# Patient Record
Sex: FEMALE | Race: WHITE
[De-identification: names, ages, dates, MRNs, and addresses within clinical notes are randomized per-mention and may not be internally consistent; named-entity substitution may affect disease eponyms.]

---

## 2017-03-29 NOTE — REP
Clinical:  Menorrhagia

 

Technique:  Transabdominal pelvic ultrasound followed by transvaginal examination

for better evaluation of the endometrium and adnexa with color Doppler evaluation

of the ovaries.

 

Findings:

 

Bladder is unremarkable and measures 12.9 x 8.8 x 6.4 cm .

 

Heterogeneous myomatous uterus measures 8.5 x 5.0 x 5.7 cm with 1 cm left lateral

intramural fibroid, 1.5 cm posterior left intramural fibroid, and 0.7 cm midline

posterior intramural fibroid.  The endometrial complex measures 13.9 mm

thickness.

 

Bilateral ovaries are normal in appearance.  Right ovary measures 3.0 x 2.6 x 1.8

cm.  Left ovary measures 2.6 x 1.4 x 2.6 cm.

 

No pelvic fluid or adnexal mass lesion.

 

Impression:

1.  Heterogeneous uterus with three definable fibroids measuring up to 1.5 cm as

described above.

## 2017-08-09 ENCOUNTER — HOSPITAL ENCOUNTER (OUTPATIENT)
Dept: HOSPITAL 53 - M LAB | Age: 42
End: 2017-08-09
Attending: FAMILY MEDICINE
Payer: COMMERCIAL

## 2017-08-09 DIAGNOSIS — Z00.00: Primary | ICD-10-CM

## 2017-08-09 LAB
ALBUMIN SERPL BCG-MCNC: 3.9 GM/DL (ref 3.2–5.2)
ALBUMIN/GLOB SERPL: 1.22 {RATIO} (ref 1–1.93)
ALP SERPL-CCNC: 37 U/L (ref 45–117)
ALT SERPL W P-5'-P-CCNC: 18 U/L (ref 12–78)
ANION GAP SERPL CALC-SCNC: 7 MEQ/L (ref 8–16)
AST SERPL-CCNC: 10 U/L (ref 15–37)
BASOPHILS # BLD AUTO: 0 K/MM3 (ref 0–0.2)
BASOPHILS NFR BLD AUTO: 1 % (ref 0–1)
BILIRUB SERPL-MCNC: 0.4 MG/DL (ref 0.2–1)
BUN SERPL-MCNC: 12 MG/DL (ref 7–18)
CALCIUM SERPL-MCNC: 8.9 MG/DL (ref 8.5–10.1)
CHLORIDE SERPL-SCNC: 106 MEQ/L (ref 98–107)
CHOLEST SERPL-MCNC: 168 MG/DL (ref ?–200)
CO2 SERPL-SCNC: 28 MEQ/L (ref 21–32)
CREAT SERPL-MCNC: 1.06 MG/DL (ref 0.55–1.02)
EOSINOPHIL # BLD AUTO: 0.3 K/MM3 (ref 0–0.5)
EOSINOPHIL NFR BLD AUTO: 6.2 % (ref 0–3)
ERYTHROCYTE [DISTWIDTH] IN BLOOD BY AUTOMATED COUNT: 12.8 % (ref 11.5–14.5)
GFR SERPL CREATININE-BSD FRML MDRD: 60 ML/MIN/{1.73_M2} (ref 58–?)
GLUCOSE SERPL-MCNC: 102 MG/DL (ref 70–105)
LARGE UNSTAINED CELL #: 0.1 K/MM3 (ref 0–0.4)
LARGE UNSTAINED CELL %: 2.4 % (ref 0–4)
LYMPHOCYTES # BLD AUTO: 1.4 K/MM3 (ref 1.5–4.5)
LYMPHOCYTES NFR BLD AUTO: 29 % (ref 24–44)
MCH RBC QN AUTO: 31.3 PG (ref 27–33)
MCHC RBC AUTO-ENTMCNC: 33.6 G/DL (ref 32–36.5)
MCV RBC AUTO: 93.2 FL (ref 80–96)
MONOCYTES # BLD AUTO: 0.3 K/MM3 (ref 0–0.8)
MONOCYTES NFR BLD AUTO: 6.6 % (ref 0–5)
NEUTROPHILS # BLD AUTO: 2.4 K/MM3 (ref 1.8–7.7)
NEUTROPHILS NFR BLD AUTO: 54.8 % (ref 36–66)
PLATELET # BLD AUTO: 249 K/MM3 (ref 150–450)
POTASSIUM SERPL-SCNC: 4.4 MEQ/L (ref 3.5–5.1)
PROT SERPL-MCNC: 7.1 GM/DL (ref 6.4–8.2)
SODIUM SERPL-SCNC: 141 MEQ/L (ref 136–145)
TRIGL SERPL-MCNC: 64 MG/DL (ref ?–150)
WBC # BLD AUTO: 4.4 K/MM3 (ref 4–10)

## 2018-07-24 ENCOUNTER — HOSPITAL ENCOUNTER (OUTPATIENT)
Dept: HOSPITAL 53 - M LAB | Age: 43
End: 2018-07-24
Attending: FAMILY MEDICINE
Payer: COMMERCIAL

## 2018-07-24 DIAGNOSIS — Z00.00: Primary | ICD-10-CM

## 2018-07-24 LAB
25(OH)D3 SERPL-MCNC: 85.2 NG/ML (ref 30–100)
ALBUMIN/GLOBULIN RATIO: 1.09 (ref 1–1.93)
ALBUMIN: 3.8 GM/DL (ref 3.2–5.2)
ALKALINE PHOSPHATASE: 41 U/L (ref 45–117)
ALT SERPL W P-5'-P-CCNC: 21 U/L (ref 12–78)
ANION GAP: 8 MEQ/L (ref 8–16)
AST SERPL-CCNC: 10 U/L (ref 7–37)
BASO #: 0.1 10^3/UL (ref 0–0.2)
BASO %: 0.8 % (ref 0–1)
BILIRUBIN,TOTAL: 0.4 MG/DL (ref 0.2–1)
BLOOD UREA NITROGEN: 19 MG/DL (ref 7–18)
CALCIUM LEVEL: 8.8 MG/DL (ref 8.5–10.1)
CARBON DIOXIDE LEVEL: 27 MEQ/L (ref 21–32)
CHLORIDE LEVEL: 105 MEQ/L (ref 98–107)
CHOLEST SERPL-MCNC: 160 MG/DL (ref ?–200)
CHOLESTEROL RISK RATIO: 2.62 (ref ?–5)
CREATININE FOR GFR: 1.03 MG/DL (ref 0.55–1.3)
EOS #: 0.3 10^3/UL (ref 0–0.5)
EOSINOPHIL NFR BLD AUTO: 4.7 % (ref 0–3)
FREE T4: 0.83 NG/DL (ref 0.76–1.46)
GFR SERPL CREATININE-BSD FRML MDRD: > 60 ML/MIN/{1.73_M2} (ref 58–?)
GLUCOSE, FASTING: 91 MG/DL (ref 70–100)
HDLC SERPL-MCNC: 61 MG/DL (ref 40–?)
HEMATOCRIT: 40.1 % (ref 36–47)
HEMOGLOBIN: 13 G/DL (ref 12–15.5)
IMMATURE GRANULOCYTE %: 0.5 % (ref 0–3)
LDL CHOLESTEROL: 87 MG/DL (ref ?–100)
LYMPH #: 1.4 10^3/UL (ref 1.5–4.5)
LYMPH %: 23.1 % (ref 24–44)
MEAN CORPUSCULAR HEMOGLOBIN: 29.7 PG (ref 27–33)
MEAN CORPUSCULAR HGB CONC: 32.4 G/DL (ref 32–36.5)
MEAN CORPUSCULAR VOLUME: 91.6 FL (ref 80–96)
MONO #: 0.4 10^3/UL (ref 0–0.8)
MONO %: 6.8 % (ref 0–5)
NEUTROPHILS #: 3.9 10^3/UL (ref 1.8–7.7)
NEUTROPHILS %: 64.1 % (ref 36–66)
NONHDLC SERPL-MCNC: 99 MG/DL
NRBC BLD AUTO-RTO: 0 % (ref 0–0)
PLATELET COUNT, AUTOMATED: 233 10^3/UL (ref 150–450)
POTASSIUM SERUM: 4.4 MEQ/L (ref 3.5–5.1)
RED BLOOD COUNT: 4.38 10^6/UL (ref 4–5.4)
RED CELL DISTRIBUTION WIDTH: 13.2 % (ref 11.5–14.5)
SODIUM LEVEL: 140 MEQ/L (ref 136–145)
THYROID STIMULATING HORMONE: 1.42 UIU/ML (ref 0.36–3.74)
TOTAL PROTEIN: 7.3 GM/DL (ref 6.4–8.2)
TRIGLYCERIDES LEVEL: 60 MG/DL (ref ?–150)
WHITE BLOOD COUNT: 6.1 10^3/UL (ref 4–10)

## 2018-07-24 PROCEDURE — 84443 ASSAY THYROID STIM HORMONE: CPT

## 2019-08-14 ENCOUNTER — HOSPITAL ENCOUNTER (OUTPATIENT)
Dept: HOSPITAL 53 - M LAB | Age: 44
End: 2019-08-14
Attending: FAMILY MEDICINE
Payer: COMMERCIAL

## 2019-08-14 DIAGNOSIS — Z00.00: Primary | ICD-10-CM

## 2019-08-14 LAB
ALBUMIN SERPL BCG-MCNC: 3.6 GM/DL (ref 3.2–5.2)
ALT SERPL W P-5'-P-CCNC: 19 U/L (ref 12–78)
BASOPHILS # BLD AUTO: 0 10^3/UL (ref 0–0.2)
BASOPHILS NFR BLD AUTO: 0.8 % (ref 0–1)
BILIRUB SERPL-MCNC: 0.3 MG/DL (ref 0.2–1)
BUN SERPL-MCNC: 14 MG/DL (ref 7–18)
CALCIUM SERPL-MCNC: 8.6 MG/DL (ref 8.5–10.1)
CHLORIDE SERPL-SCNC: 104 MEQ/L (ref 98–107)
CHOLEST SERPL-MCNC: 194 MG/DL (ref ?–200)
CHOLEST/HDLC SERPL: 2.85 {RATIO} (ref ?–5)
CO2 SERPL-SCNC: 28 MEQ/L (ref 21–32)
CREAT SERPL-MCNC: 0.87 MG/DL (ref 0.55–1.3)
EOSINOPHIL # BLD AUTO: 0.3 10^3/UL (ref 0–0.5)
EOSINOPHIL NFR BLD AUTO: 6.9 % (ref 0–3)
GFR SERPL CREATININE-BSD FRML MDRD: > 60 ML/MIN/{1.73_M2} (ref 58–?)
GLUCOSE SERPL-MCNC: 97 MG/DL (ref 70–100)
HCT VFR BLD AUTO: 37.3 % (ref 36–47)
HDLC SERPL-MCNC: 68 MG/DL (ref 40–?)
HGB BLD-MCNC: 12.1 G/DL (ref 12–15.5)
LDLC SERPL CALC-MCNC: 114 MG/DL (ref ?–100)
LYMPHOCYTES # BLD AUTO: 1.4 10^3/UL (ref 1.5–4.5)
LYMPHOCYTES NFR BLD AUTO: 27.4 % (ref 24–44)
MCH RBC QN AUTO: 30 PG (ref 27–33)
MCHC RBC AUTO-ENTMCNC: 32.4 G/DL (ref 32–36.5)
MCV RBC AUTO: 92.6 FL (ref 80–96)
MONOCYTES # BLD AUTO: 0.4 10^3/UL (ref 0–0.8)
MONOCYTES NFR BLD AUTO: 7.1 % (ref 0–5)
NEUTROPHILS # BLD AUTO: 2.8 10^3/UL (ref 1.8–7.7)
NEUTROPHILS NFR BLD AUTO: 57.6 % (ref 36–66)
NONHDLC SERPL-MCNC: 126 MG/DL
PLATELET # BLD AUTO: 267 10^3/UL (ref 150–450)
POTASSIUM SERPL-SCNC: 4.2 MEQ/L (ref 3.5–5.1)
PROT SERPL-MCNC: 6.8 GM/DL (ref 6.4–8.2)
RBC # BLD AUTO: 4.03 10^6/UL (ref 4–5.4)
SODIUM SERPL-SCNC: 140 MEQ/L (ref 136–145)
TRIGL SERPL-MCNC: 59 MG/DL (ref ?–150)
TSH SERPL DL<=0.005 MIU/L-ACNC: 2.1 UIU/ML (ref 0.36–3.74)
WBC # BLD AUTO: 4.9 10^3/UL (ref 4–10)

## 2019-11-07 ENCOUNTER — HOSPITAL ENCOUNTER (OUTPATIENT)
Dept: HOSPITAL 53 - M SFHCWAGY | Age: 44
End: 2019-11-07
Attending: NURSE PRACTITIONER
Payer: COMMERCIAL

## 2019-11-07 DIAGNOSIS — Z12.4: Primary | ICD-10-CM

## 2019-11-07 PROCEDURE — 87624 HPV HI-RISK TYP POOLED RSLT: CPT

## 2019-11-08 LAB — HPV LOW VOL RFLX: (no result)

## 2020-09-11 ENCOUNTER — HOSPITAL ENCOUNTER (OUTPATIENT)
Dept: HOSPITAL 53 - M LAB | Age: 45
End: 2020-09-11
Attending: NURSE PRACTITIONER
Payer: COMMERCIAL

## 2020-09-11 DIAGNOSIS — Z00.00: Primary | ICD-10-CM

## 2020-09-11 LAB
25(OH)D3 SERPL-MCNC: 66.4 NG/ML (ref 30–100)
ALBUMIN SERPL BCG-MCNC: 3.6 GM/DL (ref 3.2–5.2)
ALT SERPL W P-5'-P-CCNC: 23 IU/L (ref 0–32)
BASOPHILS # BLD AUTO: 0 10^3/UL (ref 0–0.2)
BASOPHILS NFR BLD AUTO: 0.6 % (ref 0–1)
BILIRUB SERPL-MCNC: 0.3 MG/DL (ref 0.2–1)
BUN SERPL-MCNC: 17 MG/DL (ref 7–18)
CALCIUM SERPL-MCNC: 9 MG/DL (ref 8.5–10.1)
CHLORIDE SERPL-SCNC: 107 MEQ/L (ref 98–107)
CHOLEST SERPL-MCNC: 225 MG/DL (ref ?–200)
CHOLEST/HDLC SERPL: 2.92 {RATIO} (ref ?–5)
CO2 SERPL-SCNC: 28 MMOL/L (ref 20–29)
CREAT SERPL-MCNC: 0.9 MG/DL (ref 0.55–1.3)
EOSINOPHIL # BLD AUTO: 0.2 10^3/UL (ref 0–0.5)
EOSINOPHIL NFR BLD AUTO: 3.6 % (ref 0–3)
GFR SERPL CREATININE-BSD FRML MDRD: > 60 ML/MIN/{1.73_M2} (ref 58–?)
GLUCOSE SERPL-MCNC: 94 MG/DL (ref 70–100)
HCT VFR BLD AUTO: 35.2 % (ref 36–47)
HDLC SERPL-MCNC: 77 MG/DL (ref 40–?)
HGB BLD-MCNC: 10.9 G/DL (ref 12–15.5)
LDLC SERPL CALC-MCNC: 134.6 MG/DL (ref ?–100)
LYMPHOCYTES # BLD AUTO: 1.3 10^3/UL (ref 1.5–5)
LYMPHOCYTES NFR BLD AUTO: 27.7 % (ref 24–44)
MCH RBC QN AUTO: 25.5 PG (ref 27–33)
MCHC RBC AUTO-ENTMCNC: 31 G/DL (ref 32–36.5)
MCV RBC AUTO: 82.2 FL (ref 80–96)
MONOCYTES # BLD AUTO: 0.4 10^3/UL (ref 0–0.8)
MONOCYTES NFR BLD AUTO: 8.6 % (ref 0–5)
NEUTROPHILS # BLD AUTO: 2.8 10^3/UL (ref 1.5–8.5)
NEUTROPHILS NFR BLD AUTO: 58.9 % (ref 36–66)
NONHDLC SERPL-MCNC: 148 MG/DL
PLATELET # BLD AUTO: 335 10^3/UL (ref 150–450)
POTASSIUM SERPL-SCNC: 4.3 MEQ/L (ref 3.5–5.1)
PROT SERPL-MCNC: 7.2 GM/DL (ref 6.4–8.2)
RBC # BLD AUTO: 4.28 10^6/UL (ref 4–5.4)
SODIUM SERPL-SCNC: 139 MEQ/L (ref 136–145)
T4 FREE SERPL-MCNC: 0.84 NG/DL (ref 0.76–1.46)
TRIGL SERPL-MCNC: 67 MG/DL (ref ?–150)
TSH SERPL DL<=0.005 MIU/L-ACNC: 1.78 UIU/ML (ref 0.36–3.74)
WBC # BLD AUTO: 4.8 10^3/UL (ref 4–10)

## 2021-02-17 ENCOUNTER — HOSPITAL ENCOUNTER (OUTPATIENT)
Dept: HOSPITAL 53 - M RAD | Age: 46
End: 2021-02-17
Attending: OTOLARYNGOLOGY
Payer: COMMERCIAL

## 2021-02-17 DIAGNOSIS — J32.0: Primary | ICD-10-CM

## 2021-02-18 NOTE — REP
INDICATION:

SINUSITIS, WITH BRAIN LAB.



COMPARISON:

Comparison study 10 November 2005..



TECHNIQUE:

Helical scanning is acquired and 2 mm axial images re-formatted.  Coronal MPR images

are generated and reviewed.



FINDINGS:

There is a mucous retention cyst in the floor of the left maxillary sinus.  The

maxillary sinuses are otherwise clear.  There is no evidence of sphenoid, ethmoid, or

mastoid disease.  The frontal sinuses are not developed on either side.  No

intraorbital abnormality is seen.  Bony nasal septum deviates slightly to the left.

Nasal turbinates soft tissues are unremarkable.  Ostiomeatal complexes are widely

patent bilaterally.  Nasal ethmoid recesses are clear.  No intracranial abnormality is

seen.  Nasal pharyngeal airway is unremarkable.  The facial soft tissues are normal.

The visualized mandibular structures are unremarkable.



IMPRESSION:

There is a mucous retention cyst in the floor of the left maxillary sinus.  Otherwise

normal paranasal sinus CT study.





<Electronically signed by Sherif Salazar > 02/18/21 [id number]

## 2021-04-11 ENCOUNTER — HOSPITAL ENCOUNTER (INPATIENT)
Dept: HOSPITAL 53 - M ED | Age: 46
LOS: 3 days | Discharge: HOME | DRG: 340 | End: 2021-04-14
Attending: SURGERY | Admitting: SURGERY
Payer: COMMERCIAL

## 2021-04-11 VITALS — DIASTOLIC BLOOD PRESSURE: 78 MMHG | SYSTOLIC BLOOD PRESSURE: 128 MMHG

## 2021-04-11 VITALS — SYSTOLIC BLOOD PRESSURE: 136 MMHG | DIASTOLIC BLOOD PRESSURE: 79 MMHG

## 2021-04-11 VITALS — DIASTOLIC BLOOD PRESSURE: 72 MMHG | SYSTOLIC BLOOD PRESSURE: 129 MMHG

## 2021-04-11 VITALS — DIASTOLIC BLOOD PRESSURE: 70 MMHG | SYSTOLIC BLOOD PRESSURE: 128 MMHG

## 2021-04-11 VITALS — WEIGHT: 161.38 LBS | HEIGHT: 61 IN | BODY MASS INDEX: 30.47 KG/M2

## 2021-04-11 VITALS — DIASTOLIC BLOOD PRESSURE: 73 MMHG | SYSTOLIC BLOOD PRESSURE: 155 MMHG

## 2021-04-11 DIAGNOSIS — D64.9: ICD-10-CM

## 2021-04-11 DIAGNOSIS — K35.33: Primary | ICD-10-CM

## 2021-04-11 DIAGNOSIS — Z79.899: ICD-10-CM

## 2021-04-11 DIAGNOSIS — I10: ICD-10-CM

## 2021-04-11 PROCEDURE — 0DTJ4ZZ RESECTION OF APPENDIX, PERCUTANEOUS ENDOSCOPIC APPROACH: ICD-10-PCS | Performed by: SURGERY

## 2021-04-11 RX ADMIN — SODIUM CHLORIDE SCH MLS/HR: 9 INJECTION, SOLUTION INTRAVENOUS at 21:00

## 2021-04-12 VITALS — DIASTOLIC BLOOD PRESSURE: 50 MMHG | SYSTOLIC BLOOD PRESSURE: 115 MMHG

## 2021-04-12 VITALS — SYSTOLIC BLOOD PRESSURE: 106 MMHG | DIASTOLIC BLOOD PRESSURE: 58 MMHG

## 2021-04-12 VITALS — SYSTOLIC BLOOD PRESSURE: 107 MMHG | DIASTOLIC BLOOD PRESSURE: 65 MMHG

## 2021-04-12 VITALS — SYSTOLIC BLOOD PRESSURE: 132 MMHG | DIASTOLIC BLOOD PRESSURE: 65 MMHG

## 2021-04-12 VITALS — DIASTOLIC BLOOD PRESSURE: 67 MMHG | SYSTOLIC BLOOD PRESSURE: 131 MMHG

## 2021-04-12 VITALS — DIASTOLIC BLOOD PRESSURE: 69 MMHG | SYSTOLIC BLOOD PRESSURE: 115 MMHG

## 2021-04-12 VITALS — DIASTOLIC BLOOD PRESSURE: 54 MMHG | SYSTOLIC BLOOD PRESSURE: 113 MMHG

## 2021-04-12 LAB
ALBUMIN SERPL BCG-MCNC: 2.8 GM/DL (ref 3.2–5.2)
ALT SERPL W P-5'-P-CCNC: 18 U/L (ref 12–78)
BASOPHILS # BLD AUTO: 0 10^3/UL (ref 0–0.2)
BASOPHILS NFR BLD AUTO: 0.4 % (ref 0–1)
BILIRUB SERPL-MCNC: 0.3 MG/DL (ref 0.2–1)
BUN SERPL-MCNC: 9 MG/DL (ref 7–18)
CALCIUM SERPL-MCNC: 7.9 MG/DL (ref 8.5–10.1)
CHLORIDE SERPL-SCNC: 111 MEQ/L (ref 98–107)
CO2 SERPL-SCNC: 20 MEQ/L (ref 21–32)
CREAT SERPL-MCNC: 0.73 MG/DL (ref 0.55–1.3)
EOSINOPHIL # BLD AUTO: 0 10^3/UL (ref 0–0.5)
EOSINOPHIL NFR BLD AUTO: 0.4 % (ref 0–3)
GFR SERPL CREATININE-BSD FRML MDRD: > 60 ML/MIN/{1.73_M2} (ref 58–?)
GLUCOSE SERPL-MCNC: 90 MG/DL (ref 70–100)
HCT VFR BLD AUTO: 27.7 % (ref 36–47)
HGB BLD-MCNC: 8.3 G/DL (ref 12–15.5)
LYMPHOCYTES # BLD AUTO: 0.4 10^3/UL (ref 1.5–5)
LYMPHOCYTES NFR BLD AUTO: 3.9 % (ref 24–44)
MCH RBC QN AUTO: 24.7 PG (ref 27–33)
MCHC RBC AUTO-ENTMCNC: 30 G/DL (ref 32–36.5)
MCV RBC AUTO: 82.4 FL (ref 80–96)
MONOCYTES # BLD AUTO: 0.3 10^3/UL (ref 0–0.8)
MONOCYTES NFR BLD AUTO: 3.2 % (ref 2–8)
NEUTROPHILS # BLD AUTO: 8.5 10^3/UL (ref 1.5–8.5)
NEUTROPHILS NFR BLD AUTO: 91.7 % (ref 36–66)
PLATELET # BLD AUTO: 198 10^3/UL (ref 150–450)
POTASSIUM SERPL-SCNC: 3.5 MEQ/L (ref 3.5–5.1)
PROT SERPL-MCNC: 5.7 GM/DL (ref 6.4–8.2)
RBC # BLD AUTO: 3.36 10^6/UL (ref 4–5.4)
SODIUM SERPL-SCNC: 140 MEQ/L (ref 136–145)
WBC # BLD AUTO: 9.2 10^3/UL (ref 4–10)

## 2021-04-12 RX ADMIN — METRONIDAZOLE SCH MLS/HR: 500 INJECTION, SOLUTION INTRAVENOUS at 17:28

## 2021-04-12 RX ADMIN — KETOROLAC TROMETHAMINE SCH MG: 30 INJECTION, SOLUTION INTRAMUSCULAR at 17:28

## 2021-04-12 RX ADMIN — LOSARTAN POTASSIUM SCH MG: 25 TABLET, FILM COATED ORAL at 09:00

## 2021-04-12 RX ADMIN — CIPROFLOXACIN SCH MLS/HR: 2 INJECTION, SOLUTION INTRAVENOUS at 20:05

## 2021-04-12 RX ADMIN — SODIUM CHLORIDE SCH MLS/HR: 9 INJECTION, SOLUTION INTRAVENOUS at 07:43

## 2021-04-12 RX ADMIN — SODIUM CHLORIDE SCH MLS/HR: 9 INJECTION, SOLUTION INTRAVENOUS at 22:13

## 2021-04-12 RX ADMIN — METRONIDAZOLE SCH MLS/HR: 500 INJECTION, SOLUTION INTRAVENOUS at 10:48

## 2021-04-12 RX ADMIN — CIPROFLOXACIN SCH MLS/HR: 2 INJECTION, SOLUTION INTRAVENOUS at 08:00

## 2021-04-12 RX ADMIN — KETOROLAC TROMETHAMINE SCH MG: 30 INJECTION, SOLUTION INTRAMUSCULAR at 12:31

## 2021-04-12 RX ADMIN — KETOROLAC TROMETHAMINE SCH MG: 30 INJECTION, SOLUTION INTRAMUSCULAR at 23:42

## 2021-04-12 RX ADMIN — KETOROLAC TROMETHAMINE SCH MG: 30 INJECTION, SOLUTION INTRAMUSCULAR at 00:09

## 2021-04-12 RX ADMIN — METRONIDAZOLE SCH MLS/HR: 500 INJECTION, SOLUTION INTRAVENOUS at 02:22

## 2021-04-12 RX ADMIN — KETOROLAC TROMETHAMINE SCH MG: 30 INJECTION, SOLUTION INTRAMUSCULAR at 06:01

## 2021-04-12 NOTE — IPNPDOC
Text Note


Date of Service


The patient was seen on 4/12/21.





NOTE


General Surgery Dr Marie. 





The patient is a 45-year-old female admitted with acute appendicitis and 

perforation with peritonitis, POD 1 status post laparoscopic appendectomy with 

irrigation and debridement of peritonitis as per Dr. Marie 4/11/21.





The patient is resting in bed and states pain is controlled. She has been 

tolerating ice chips and sips. 





General. Awake and alert sitting up in bed


Afebrile.


Heart rate 77, respiratory rate 18, blood pressure 113/54.


Lungs clear to auscultation


S1-S2 regular rate rhythm


Abdomen with surgical dressings intact, no drainage. Abdomen is soft, mild 

tenderness around surgical sites only. Nondistended.





WBC 9.2, hemoglobin 8.3, platelets 198





Assessment/plan


POD 1 status post laparoscopic appendectomy with irrigation and debridement of 

peritonitis as per Dr. Marie. 


Pain is controlled.


Afebrile. WBC this morning 9.2


Surgical sites C/D/I. 


Tolerating ice chips


IVF 100cc/hr


Continue with IV Cipro/Flagyl


Continue to monitor.





VS,Fishbone, I+O


VS, Fishbone, I+O


Laboratory Tests


4/12/21 08:04











Vital Signs








  Date Time  Temp Pulse Resp B/P (MAP) Pulse Ox O2 Delivery O2 Flow Rate FiO2


 


4/12/21 09:00    115/69    


 


4/12/21 08:00 98.5 77 20  98 Room Air  














I&O- Last 24 Hours up to 6 AM 


 


 4/12/21





 06:00


 


Intake Total 2120 ml


 


Output Total 220 ml


 


Balance 1900 ml











Attending Note


Attending Note


Agree with note as above. 


No N/V.  Abdomen non distended and has BS.


Will start clears.  Advance in am if tolerated.


Check CBCD am.











Judith Bañuelos              Apr 12, 2021 13:32


Cliff Marie                 Apr 12, 2021 20:58

## 2021-04-12 NOTE — RO
OPERATIVE NOTE



DATE OF OPERATION: 04/11/2021



PREOPERATIVE DIAGNOSIS: Acute appendicitis.



POSTOPERATIVE DIAGNOSIS: Acute appendicitis with perforation and diffuse

peritonitis.



PROCEDURE: Laparoscopic appendectomy with irrigation and debridement of

peritonitis.



SURGEON: Cliff Marie MD



ASSISTANT: 



ANESTHESIA: General.



INDICATIONS FOR THE PROCEDURE: The patient is a 45-year-old woman with a 1-1/2

day history of abdominal pain. This had become more severe. The pain was not

just localized to the right lower quadrant but across her lower abdomen. A CT

scan showed inflammatory changes of the appendix although there was some free

fluid noted in the pelvis. She is now for a laparoscopic appendectomy for

appendicitis.



DESCRIPTION OF PROCEDURE: The patient was brought to the operating room and

placed on the table in a supine position. She was placed under general

endotracheal anesthesia. The patient's abdomen was prepped and draped in a

sterile fashion. A 0.25% Marcaine was infiltrated at each of the trocar sites

as needed. 



A short supraumbilical midline incision was made and deepened to the fascia. A

Veress needle was inserted and after a positive hanging drop test, the abdomen

was inflated with carbon dioxide gas. When the abdomen had been fully inflated,

the Veress needle was removed and the fascia was incised, and a 12-mm port

placed without difficulty. The laparoscope was inserted and immediately

purulent-appearing fluid was noted in the pelvis in the right pericolic gutter

and up along the lateral aspect of the liver. The small bowel appeared somewhat

reddened and inflamed. Two 5-mm trocars were placed in the left lower quadrant.

A grasper and suction  were inserted and the large amount of purulent

fluid was suctioned from the pelvis. A portion of this was trapped in a

specimen trap and sent for Gram stain and culture and sensitivity. Following

this, the fluid was irrigated from the pelvis and right

lower quadrant were cleared of fluid as much as possible. The terminal ileum

was then folded medially and the tip of the inflamed appendix was identified

just lateral to the terminal ileum. There were some old-appearing perhaps

developmental attachments around the appendix tethering this to the lateral

aspect of the terminal ileum and the cecum. These were lysed with the hook

cautery to free the appendix. Dissection then continued to divide the

mesoappendix using the hook cautery with care to thoroughly cauterize the

vessels. The dissection proceeded freeing the terminal ileum off of the medial

aspect of the appendix. It was clear that the appendix was perforated. The

appendix was quite short overall. Once the base of the appendix had been fully

exposed and dissected free, a 45-mm endoscopic linear cutter with a blue load

was used to transect the base of the appendix where it joined the cecum. The

appendix was placed in an Endopouch. I then systematically irrigated the entire

abdomen. The patient was initially placed into a slight Trendelenburg position

and the pelvis was thoroughly irrigated. The patient was then placed flat and

the right upper quadrant and left upper quadrant were then irrigated. The

patient was then placed into a moderately steep reverse Trendelenburg to allow

the fluid to all drain toward the pelvis. The irrigation was continued until it

returned quite clear and this was all then suctioned from the abdomen as

thoroughly as possible. The patient was then returned to a flat position to

remove any remaining fluid in the pelvis. Final inspection revealed that the

appendiceal stump was well sealed without bleeding. There was no obvious

remaining debris in the right lower quadrant or pelvis. I did not feel a drain

would be of benefit in this patient. The abdomen was, therefore, deflated and

the trocars were removed. The appendix was recovered through the supraumbilical

site and sent for permanent pathology. The fascia at the supraumbilical site

was closed with interrupted sutures of 1-0 Vicryl. The skin incisions were then

all closed with 4-0 Vicryl and Steri-Strips. Light dressings were applied. The

patient tolerated the procedure well without apparent complication. She was

awakened in the operating room, extubated, and moved to the recovery room in

stable condition.

MARY

## 2021-04-13 VITALS — SYSTOLIC BLOOD PRESSURE: 142 MMHG | DIASTOLIC BLOOD PRESSURE: 80 MMHG

## 2021-04-13 VITALS — DIASTOLIC BLOOD PRESSURE: 81 MMHG | SYSTOLIC BLOOD PRESSURE: 122 MMHG

## 2021-04-13 VITALS — DIASTOLIC BLOOD PRESSURE: 97 MMHG | SYSTOLIC BLOOD PRESSURE: 155 MMHG

## 2021-04-13 VITALS — DIASTOLIC BLOOD PRESSURE: 90 MMHG | SYSTOLIC BLOOD PRESSURE: 150 MMHG

## 2021-04-13 VITALS — DIASTOLIC BLOOD PRESSURE: 89 MMHG | SYSTOLIC BLOOD PRESSURE: 136 MMHG

## 2021-04-13 VITALS — SYSTOLIC BLOOD PRESSURE: 137 MMHG | DIASTOLIC BLOOD PRESSURE: 63 MMHG

## 2021-04-13 LAB
EOSINOPHIL NFR BLD MANUAL: 5 % (ref 0–3)
HCT VFR BLD AUTO: 25.6 % (ref 36–47)
HGB BLD-MCNC: 8 G/DL (ref 12–15.5)
LYMPHOCYTES NFR BLD MANUAL: 4 % (ref 16–44)
MCH RBC QN AUTO: 25.1 PG (ref 27–33)
MCHC RBC AUTO-ENTMCNC: 31.3 G/DL (ref 32–36.5)
MCV RBC AUTO: 80.3 FL (ref 80–96)
NEUTROPHILS NFR BLD MANUAL: 91 % (ref 28–66)
PLATELET # BLD AUTO: 178 10^3/UL (ref 150–450)
PLATELET BLD QL SMEAR: NORMAL
RBC # BLD AUTO: 3.19 10^6/UL (ref 4–5.4)
WBC # BLD AUTO: 5.2 10^3/UL (ref 4–10)

## 2021-04-13 RX ADMIN — LOSARTAN POTASSIUM SCH MG: 25 TABLET, FILM COATED ORAL at 08:21

## 2021-04-13 RX ADMIN — SODIUM CHLORIDE SCH MLS/HR: 9 INJECTION, SOLUTION INTRAVENOUS at 12:10

## 2021-04-13 RX ADMIN — METRONIDAZOLE SCH MLS/HR: 500 INJECTION, SOLUTION INTRAVENOUS at 09:52

## 2021-04-13 RX ADMIN — CIPROFLOXACIN SCH MLS/HR: 2 INJECTION, SOLUTION INTRAVENOUS at 08:20

## 2021-04-13 RX ADMIN — KETOROLAC TROMETHAMINE SCH MG: 30 INJECTION, SOLUTION INTRAMUSCULAR at 17:47

## 2021-04-13 RX ADMIN — SODIUM CHLORIDE SCH MLS/HR: 9 INJECTION, SOLUTION INTRAVENOUS at 12:18

## 2021-04-13 RX ADMIN — METRONIDAZOLE SCH MLS/HR: 500 INJECTION, SOLUTION INTRAVENOUS at 17:47

## 2021-04-13 RX ADMIN — CIPROFLOXACIN SCH MLS/HR: 2 INJECTION, SOLUTION INTRAVENOUS at 20:58

## 2021-04-13 RX ADMIN — KETOROLAC TROMETHAMINE SCH MG: 30 INJECTION, SOLUTION INTRAMUSCULAR at 12:15

## 2021-04-13 RX ADMIN — METRONIDAZOLE SCH MLS/HR: 500 INJECTION, SOLUTION INTRAVENOUS at 01:41

## 2021-04-13 RX ADMIN — KETOROLAC TROMETHAMINE SCH MG: 30 INJECTION, SOLUTION INTRAMUSCULAR at 05:42

## 2021-04-13 NOTE — IPNPDOC
Text Note


Date of Service


The patient was seen on 4/13/21.





NOTE


General Surgery Dr Marie. 





The patient is a 45-year-old female admitted with acute appendicitis and 

perforation with peritonitis, POD 2 status post laparoscopic appendectomy with 

irrigation and debridement of peritonitis as per Dr. Marie 4/11/21.





The patient is OOb to chair and states pain is controlled. She has been 

tolerating clear liquids. Denies N/V. Reports flatus and BM x1 yesterday and 

this AM, states diarrhea. 





General. Awake and alert, NAD


Tmax 100.3 noon 4/12. 100.1 at [place]. Afebrile this AM. 


VSS


Lungs clear to auscultation


S1-S2 regular rate rhythm


Abdomen with surgical dressings intact, no drainage. Abdomen is soft, mild 

tenderness around surgical sites. Nondistended.





WBC 5.2, hemoglobin 8.0, platelets 178


I/O 1780/1901, -121.





Assessment/plan


POD 2 status post laparoscopic appendectomy with irrigation and debridement of 

peritonitis as per Dr. Marie. 


Pain is controlled with Toradol IV. 


Tmax 100.3 past 24 hrs. 


WBC this morning 5.2


Surgical sites C/D/I. 


Tolerating clears, advance to reg diet


IVF 100cc/hr


Continue with IV Cipro/Flagyl


Continue to monitor.





PND. States uses homeopathic nasal spray at home, added saline nasal spray 

Q2prn.





VS,Fishbone, I+O


VS, Fishbone, I+O


Laboratory Tests


4/13/21 06:46











Vital Signs








  Date Time  Temp Pulse Resp B/P (MAP) Pulse Ox O2 Delivery O2 Flow Rate FiO2


 


4/13/21 08:21    155/97    


 


4/13/21 08:18 97.7 76 16  100   


 


4/13/21 04:00      Room Air  














I&O- Last 24 Hours up to 6 AM 


 


 4/13/21





 05:59


 


Intake Total 1680 ml


 


Output Total 2301 ml


 


Balance -621 ml











Attending Note


Attending Note


Agree with above note.


WBC 5.2 but diff shows high percent of Neutrophils.  


Will continue IV abx and recheck CBCD in am.  Awaiting peritoneal cultures.











Judith Bañuelos              Apr 13, 2021 08:40


Cliff Marie                 Apr 14, 2021 00:59

## 2021-04-14 VITALS — SYSTOLIC BLOOD PRESSURE: 158 MMHG | DIASTOLIC BLOOD PRESSURE: 82 MMHG

## 2021-04-14 VITALS — DIASTOLIC BLOOD PRESSURE: 82 MMHG | SYSTOLIC BLOOD PRESSURE: 158 MMHG

## 2021-04-14 VITALS — SYSTOLIC BLOOD PRESSURE: 145 MMHG | DIASTOLIC BLOOD PRESSURE: 82 MMHG

## 2021-04-14 VITALS — DIASTOLIC BLOOD PRESSURE: 93 MMHG | SYSTOLIC BLOOD PRESSURE: 165 MMHG

## 2021-04-14 LAB
BUN SERPL-MCNC: 4 MG/DL (ref 7–18)
CALCIUM SERPL-MCNC: 7.9 MG/DL (ref 8.5–10.1)
CHLORIDE SERPL-SCNC: 110 MEQ/L (ref 98–107)
CO2 SERPL-SCNC: 24 MEQ/L (ref 21–32)
CREAT SERPL-MCNC: 0.69 MG/DL (ref 0.55–1.3)
EOSINOPHIL NFR BLD MANUAL: 20 % (ref 0–3)
GFR SERPL CREATININE-BSD FRML MDRD: > 60 ML/MIN/{1.73_M2} (ref 58–?)
GLUCOSE SERPL-MCNC: 130 MG/DL (ref 70–100)
HCT VFR BLD AUTO: 26.2 % (ref 36–47)
HGB BLD-MCNC: 8 G/DL (ref 12–15.5)
LYMPHOCYTES NFR BLD MANUAL: 12 % (ref 16–44)
MCH RBC QN AUTO: 24.5 PG (ref 27–33)
MCHC RBC AUTO-ENTMCNC: 30.5 G/DL (ref 32–36.5)
MCV RBC AUTO: 80.4 FL (ref 80–96)
MONOCYTES NFR BLD MANUAL: 3 % (ref 0–5)
NEUTROPHILS NFR BLD MANUAL: 65 % (ref 28–66)
PLATELET # BLD AUTO: 201 10^3/UL (ref 150–450)
PLATELET BLD QL SMEAR: NORMAL
POTASSIUM SERPL-SCNC: 3.4 MEQ/L (ref 3.5–5.1)
RBC # BLD AUTO: 3.26 10^6/UL (ref 4–5.4)
RBC MORPH BLD: NORMAL
SODIUM SERPL-SCNC: 142 MEQ/L (ref 136–145)
WBC # BLD AUTO: 3.9 10^3/UL (ref 4–10)

## 2021-04-14 RX ADMIN — KETOROLAC TROMETHAMINE SCH MG: 30 INJECTION, SOLUTION INTRAMUSCULAR at 00:10

## 2021-04-14 RX ADMIN — SODIUM CHLORIDE SCH MLS/HR: 9 INJECTION, SOLUTION INTRAVENOUS at 02:03

## 2021-04-14 RX ADMIN — METRONIDAZOLE SCH MLS/HR: 500 INJECTION, SOLUTION INTRAVENOUS at 02:03

## 2021-04-14 RX ADMIN — CIPROFLOXACIN SCH MLS/HR: 2 INJECTION, SOLUTION INTRAVENOUS at 08:14

## 2021-04-14 RX ADMIN — LOSARTAN POTASSIUM SCH MG: 25 TABLET, FILM COATED ORAL at 08:14

## 2021-04-14 RX ADMIN — KETOROLAC TROMETHAMINE SCH MG: 30 INJECTION, SOLUTION INTRAMUSCULAR at 05:36

## 2021-04-14 NOTE — DS.PDOC
Discharge Summary


General


Date of Admission


Apr 11, 2021 at 21:39


Date of Discharge


4/14/21





Discharge Summary


General Surgery.  Dr Marie





PROCEDURES PERFORMED DURING STAY: 


Status post laparoscopic appendectomy with irrigation and debridement of 

peritonitis as per Dr. Marie 4/11/21.





ADMITTING DIAGNOSES: 


Acute appendicitis


Hypertension





DISCHARGE DIAGNOSES


Acute appendicitis/status post laparoscopic appendectomy with irrigation and 

debridement of peritonitis as per Dr. Marie 4/11/21.


Hypertension


Chronic anemia











HISTORY OF PRESENT ILLNESS: The patient is a 45-year-old female who had reported

a 1-1/2 day history of abdominal pain becoming more severe across her lower 

abdomen. CT scan indicated inflammatory changes of the appendix with some free 

fluid noted in the pelvis. The patient was transferred from outside hospital 

related to acute appendicitis and taken to the operating room for laparoscopic 

appendectomy 4/11/21 as per Dr. Marie.





HOSPITAL COURSE:  


The patient recovered well following the procedure. She was noted to have a low-

grade temperature and was continued on IV antibiotics. Abdominal fluid culture 

indicated many white blood cells however no organisms were seen. At the time of 

discharge final culture results are pending. The patient continued to recover 

well, was advanced to regular diet. Was eating and drinking, reported bowel 

movements. By 4/14/21 the patient was felt stable for discharge to continue a 

short course of oral antibiotics as an outpatient.


At the time of her admission she was noted to have anemia with hemoglobin  8.3, 

this remained stable at 8.0 during her admission. At the time of discharge, 

white blood cell count was noted to be 3.9. Platelets 201.





DISCHARGE MEDICATIONS: Please see below.


 


ALLERGIES: Please see below.





PHYSICAL EXAMINATION ON DISCHARGE:


VITAL SIGNS: Please see below.


GENERAL: No acute distress, sitting up in bed


HEENT: MMM


CARDIOVASCULAR EXAMINATION: S1-S2 regular rate and rhythm


RESPIRATORY EXAMINATION: Clear to auscultation


ABDOMINAL EXAMINATION: Soft, nontender, nondistended. Surgical sites are 

clean/dry/intact. No drainage noted.


EXTREMITIES: No edema.


SKIN: No rashes.


NEUROLOGICAL EXAMINATION: no focal deficits


PSYCHIATRIC EXAMINATION: Pleasant and cooperative





LABORATORY DATA: Please see below.











DISCHARGE INSTRUCTIONS:


ACTIVITY: As tolerated, no lifting greater than when he pounds for 2 weeks.


Okay to shower, no bath or swimming for 5 days.


Keep incisions clean and dry, a left Steri-Strips to fall off on their own.


DIET: Regular


DISPOSITION: 01 Home, Self-Care.


Cipro 500 mg by mouth twice a day for an additional 5 days.


Flagyl 500 mg by mouth 4 times a day for an additional 5 days.


The patient reports history of yeast infection with the use of antibiotics and 

requested Diflucan as this usually works for her. Diflucan 150 mg by mouth 1 

dose was given.


Arrange follow-up with Dr. Marie in 7-10 days.


We have advised the patient to call back to the office with any questions or 

concerns or change in symptoms such as fevers, chills, increasing abdominal pain

or nausea or vomiting.


Arrange follow-up with PCP for further evaluation of anemia.





ITEMS TO FOLLOWUP ON ON OUTPATIENT:


1. Normocytic Anemia.


Hemoglobin on admission is noted to be 8.3. This remained stable at 8.0 during 

her admission. 


WBC on the day of discharge 3.9. 


Platelets 201. 


RBC 3.26. MCV 80.4. RDW 15.7.


We have advised the patient that we would recommend the patient follow up with 

her PCP for further evaluation and management of her anemia. Will ensure the 

patient has a scheduled appointment with her PCP at discharge. The patient 

verbalizes understanding and agreement.





DISCHARGE CONDITION: Stable.





TIME SPENT ON DISCHARGE: Greater than 30 minutes.





Vital Signs/I&Os





Vital Signs








  Date Time  Temp Pulse Resp B/P (MAP) Pulse Ox O2 Delivery O2 Flow Rate FiO2


 


4/14/21 08:14    158/82    


 


4/14/21 08:00 97.8 81 17  98 Room Air  














I&O- Last 24 Hours up to 6 AM 


 


 4/14/21





 06:00


 


Intake Total 4580 ml


 


Output Total 4700 ml


 


Balance -120 ml











Laboratory Data


Labs 24H


Laboratory Tests 2


4/14/21 07:25: 


Neutrophils (%) (Auto) , Nucleated Red Blood Cells % (auto) 0.0, Neutrophils 65,

Lymphocytes (Manual) 12L, Monocytes (Manual) 3, Eosinophils (Manual) 20H, Red 

Blood Cell Morphology NORMAL, Platelet Estimate NORMAL, Anion Gap 8, Glomerular 

Filtration Rate > 60.0, Calcium Level 7.9L


CBC/BMP


Laboratory Tests


4/14/21 07:25











Microbiology





Microbiology


4/11/21 Gram Stain - Final, Resulted


          


4/11/21 Body Fluid Culture, Resulted


          Pending


4/11/21 Anaerobic Culture - Final, Resulted





Discharge Medications


Scheduled


Ascorbic Acid (Vitamin C) 500 Mg Capsule, 500 MG PO DAILY, (Reported)


Cholecalciferol (Vitamin D3) (Vitamin D3) 1,000 Unit Tablet, 1,000 UNITS PO 

DAILY, (Reported)


Ciprofloxacin HCl (Cipro) 500 Mg Tablet, 500 MG PO BID


Fluconazole (Diflucan) 150 Mg Tablet, 1 TAB PO ONCE for yeast infection


Losartan Potassium (Losartan Potassium) 25 Mg Tablet, 25 MG PO DAILY, (Reported)


Metronidazole (Flagyl) 500 Mg Tablet, 500 MG PO Q8H


   FOR 10 DAYS 





Allergies


Coded Allergies:  


     No Known Allergies (Unverified , 5/14/09)











Judith Bañuelos              Apr 14, 2021 13:14

## 2021-05-04 ENCOUNTER — HOSPITAL ENCOUNTER (OUTPATIENT)
Dept: HOSPITAL 53 - M LAB | Age: 46
End: 2021-05-04
Attending: SURGERY
Payer: COMMERCIAL

## 2021-05-04 DIAGNOSIS — R10.31: Primary | ICD-10-CM

## 2021-05-04 LAB
ALBUMIN SERPL BCG-MCNC: 3.6 GM/DL (ref 3.2–5.2)
ALT SERPL W P-5'-P-CCNC: 20 U/L (ref 12–78)
BASOPHILS # BLD AUTO: 0 10^3/UL (ref 0–0.2)
BASOPHILS NFR BLD AUTO: 0.6 % (ref 0–1)
BILIRUB SERPL-MCNC: 0.1 MG/DL (ref 0.2–1)
BUN SERPL-MCNC: 8 MG/DL (ref 7–18)
CALCIUM SERPL-MCNC: 9.2 MG/DL (ref 8.5–10.1)
CHLORIDE SERPL-SCNC: 106 MEQ/L (ref 98–107)
CO2 SERPL-SCNC: 28 MEQ/L (ref 21–32)
CREAT SERPL-MCNC: 0.75 MG/DL (ref 0.55–1.3)
EOSINOPHIL # BLD AUTO: 0.2 10^3/UL (ref 0–0.5)
EOSINOPHIL NFR BLD AUTO: 2.3 % (ref 0–3)
GFR SERPL CREATININE-BSD FRML MDRD: > 60 ML/MIN/{1.73_M2} (ref 58–?)
GLUCOSE SERPL-MCNC: 93 MG/DL (ref 70–100)
HCT VFR BLD AUTO: 35.9 % (ref 36–47)
HGB BLD-MCNC: 10.9 G/DL (ref 12–15.5)
LYMPHOCYTES # BLD AUTO: 1.5 10^3/UL (ref 1.5–5)
LYMPHOCYTES NFR BLD AUTO: 21.4 % (ref 24–44)
MCH RBC QN AUTO: 24.7 PG (ref 27–33)
MCHC RBC AUTO-ENTMCNC: 30.4 G/DL (ref 32–36.5)
MCV RBC AUTO: 81.2 FL (ref 80–96)
MONOCYTES # BLD AUTO: 0.6 10^3/UL (ref 0–0.8)
MONOCYTES NFR BLD AUTO: 8.8 % (ref 2–8)
NEUTROPHILS # BLD AUTO: 4.6 10^3/UL (ref 1.5–8.5)
NEUTROPHILS NFR BLD AUTO: 66.3 % (ref 36–66)
PLATELET # BLD AUTO: 433 10^3/UL (ref 150–450)
POTASSIUM SERPL-SCNC: 4.3 MEQ/L (ref 3.5–5.1)
PROT SERPL-MCNC: 7.3 GM/DL (ref 6.4–8.2)
RBC # BLD AUTO: 4.42 10^6/UL (ref 4–5.4)
SODIUM SERPL-SCNC: 137 MEQ/L (ref 136–145)
WBC # BLD AUTO: 6.9 10^3/UL (ref 4–10)

## 2021-05-05 ENCOUNTER — HOSPITAL ENCOUNTER (OUTPATIENT)
Dept: HOSPITAL 53 - M LAB REF | Age: 46
End: 2021-05-05
Attending: PHYSICIAN ASSISTANT
Payer: COMMERCIAL

## 2021-05-05 ENCOUNTER — HOSPITAL ENCOUNTER (EMERGENCY)
Dept: HOSPITAL 53 - M ED | Age: 46
Discharge: HOME | End: 2021-05-05
Payer: COMMERCIAL

## 2021-05-05 VITALS — SYSTOLIC BLOOD PRESSURE: 143 MMHG | DIASTOLIC BLOOD PRESSURE: 86 MMHG

## 2021-05-05 VITALS — WEIGHT: 155.21 LBS | HEIGHT: 61 IN | BODY MASS INDEX: 29.3 KG/M2

## 2021-05-05 DIAGNOSIS — R10.9: ICD-10-CM

## 2021-05-05 DIAGNOSIS — I10: ICD-10-CM

## 2021-05-05 DIAGNOSIS — Z88.2: ICD-10-CM

## 2021-05-05 DIAGNOSIS — K76.89: ICD-10-CM

## 2021-05-05 DIAGNOSIS — N28.1: ICD-10-CM

## 2021-05-05 DIAGNOSIS — Z91.040: ICD-10-CM

## 2021-05-05 DIAGNOSIS — Z87.448: ICD-10-CM

## 2021-05-05 DIAGNOSIS — N83.299: Primary | ICD-10-CM

## 2021-05-05 DIAGNOSIS — R19.7: Primary | ICD-10-CM

## 2021-05-05 DIAGNOSIS — K21.9: ICD-10-CM

## 2021-05-05 DIAGNOSIS — J45.909: ICD-10-CM

## 2021-05-05 DIAGNOSIS — Z79.899: ICD-10-CM

## 2021-05-05 DIAGNOSIS — Z88.0: ICD-10-CM

## 2021-05-05 DIAGNOSIS — D73.4: ICD-10-CM

## 2021-05-05 LAB
ALBUMIN SERPL BCG-MCNC: 3.5 GM/DL (ref 3.2–5.2)
ALT SERPL W P-5'-P-CCNC: 17 U/L (ref 12–78)
B-HCG SERPL QL: NEGATIVE
BASOPHILS # BLD AUTO: 0 10^3/UL (ref 0–0.2)
BASOPHILS NFR BLD AUTO: 0.7 % (ref 0–1)
BILIRUB CONJ SERPL-MCNC: < 0.1 MG/DL (ref 0–0.2)
BILIRUB SERPL-MCNC: < 0.1 MG/DL (ref 0.2–1)
BUN SERPL-MCNC: 6 MG/DL (ref 7–18)
CALCIUM SERPL-MCNC: 9 MG/DL (ref 8.5–10.1)
CHLORIDE SERPL-SCNC: 107 MEQ/L (ref 98–107)
CO2 SERPL-SCNC: 26 MEQ/L (ref 21–32)
CREAT SERPL-MCNC: 0.83 MG/DL (ref 0.55–1.3)
EOSINOPHIL # BLD AUTO: 0.1 10^3/UL (ref 0–0.5)
EOSINOPHIL NFR BLD AUTO: 3 % (ref 0–3)
GFR SERPL CREATININE-BSD FRML MDRD: > 60 ML/MIN/{1.73_M2} (ref 58–?)
GLUCOSE SERPL-MCNC: 95 MG/DL (ref 70–100)
HCT VFR BLD AUTO: 35.4 % (ref 36–47)
HGB BLD-MCNC: 10.9 G/DL (ref 12–15.5)
LIPASE SERPL-CCNC: 155 U/L (ref 73–393)
LYMPHOCYTES # BLD AUTO: 1.3 10^3/UL (ref 1.5–5)
LYMPHOCYTES NFR BLD AUTO: 28.7 % (ref 24–44)
MCH RBC QN AUTO: 24.9 PG (ref 27–33)
MCHC RBC AUTO-ENTMCNC: 30.8 G/DL (ref 32–36.5)
MCV RBC AUTO: 80.8 FL (ref 80–96)
MONOCYTES # BLD AUTO: 0.5 10^3/UL (ref 0–0.8)
MONOCYTES NFR BLD AUTO: 11 % (ref 2–8)
NEUTROPHILS # BLD AUTO: 2.4 10^3/UL (ref 1.5–8.5)
NEUTROPHILS NFR BLD AUTO: 55.9 % (ref 36–66)
PLATELET # BLD AUTO: 367 10^3/UL (ref 150–450)
POTASSIUM SERPL-SCNC: 3.7 MEQ/L (ref 3.5–5.1)
PROT SERPL-MCNC: 6.9 GM/DL (ref 6.4–8.2)
RBC # BLD AUTO: 4.38 10^6/UL (ref 4–5.4)
SODIUM SERPL-SCNC: 139 MEQ/L (ref 136–145)
WBC # BLD AUTO: 4.4 10^3/UL (ref 4–10)

## 2021-05-05 PROCEDURE — 81001 URINALYSIS AUTO W/SCOPE: CPT

## 2021-05-05 PROCEDURE — 80076 HEPATIC FUNCTION PANEL: CPT

## 2021-05-05 PROCEDURE — 80048 BASIC METABOLIC PNL TOTAL CA: CPT

## 2021-05-05 PROCEDURE — 83605 ASSAY OF LACTIC ACID: CPT

## 2021-05-05 PROCEDURE — 85025 COMPLETE CBC W/AUTO DIFF WBC: CPT

## 2021-05-05 PROCEDURE — 87798 DETECT AGENT NOS DNA AMP: CPT

## 2021-05-05 PROCEDURE — 83690 ASSAY OF LIPASE: CPT

## 2021-05-05 PROCEDURE — 84703 CHORIONIC GONADOTROPIN ASSAY: CPT

## 2021-05-05 PROCEDURE — 96360 HYDRATION IV INFUSION INIT: CPT

## 2021-05-05 PROCEDURE — 99284 EMERGENCY DEPT VISIT MOD MDM: CPT

## 2021-05-05 PROCEDURE — 74177 CT ABD & PELVIS W/CONTRAST: CPT

## 2021-05-05 RX ADMIN — DIATRIZOATE MEGLUMINE AND DIATRIZOATE SODIUM SCH ML: 600; 100 SOLUTION ORAL; RECTAL at 12:54

## 2021-05-05 RX ADMIN — DIATRIZOATE MEGLUMINE AND DIATRIZOATE SODIUM SCH ML: 600; 100 SOLUTION ORAL; RECTAL at 12:21

## 2021-05-05 NOTE — REP
INDICATION:

lower abd pain, diarrhea, s/p appendectomy.



COMPARISON:

None



TECHNIQUE:

Standard helical technique after the intravenous administration of 100 cc Isovue 370

and oral bowel preparatory contrast administration.



FINDINGS:

Lung bases are clear.



The liver, gallbladder, spleen, pancreas, adrenal glands, and kidneys are essentially

unremarkable.  There is an incidental tiny a patent cyst in the lateral segment of the

left lobe, there is a tiny cyst in the anterior spleen, and there is a sub cm sized

right renal cyst.  There are no enhancing abnormalities.  The abdominal aorta and

para-aortic regions are within normal limits.  There is no free fluid or free air.

The bowel loops and the mesenteries are within normal limits.  There is mild urinary

bladder distention.  The imaged osseous structures are within normal limits.



In the left hemipelvis adjacent to the uterus there is a 4.3 cm sized oval-shaped

low-density structure which has slightly higher than water density Hounsfield unit

readings.  There is a trace amount of free pelvic fluid which is likely physiologic.



IMPRESSION:

1. Probable left ovarian cyst as described above.  Ultrasonography would be

confirmatory.

2. Incidental tiny hepatic, splenic, and right renal cyst as described above.

3. Urinary bladder distension.





<Electronically signed by Luis Bruno > 05/05/21 [id number]

## 2021-07-02 ENCOUNTER — HOSPITAL ENCOUNTER (OUTPATIENT)
Dept: HOSPITAL 53 - M LABSMTC | Age: 46
End: 2021-07-02
Attending: ANESTHESIOLOGY
Payer: COMMERCIAL

## 2021-07-02 DIAGNOSIS — Z01.812: Primary | ICD-10-CM

## 2021-07-02 DIAGNOSIS — Z20.822: ICD-10-CM

## 2021-07-07 ENCOUNTER — HOSPITAL ENCOUNTER (OUTPATIENT)
Dept: HOSPITAL 53 - M OPP | Age: 46
Discharge: HOME | End: 2021-07-07
Attending: SURGERY
Payer: COMMERCIAL

## 2021-07-07 VITALS — BODY MASS INDEX: 29.07 KG/M2 | HEIGHT: 61 IN | WEIGHT: 154 LBS

## 2021-07-07 VITALS — DIASTOLIC BLOOD PRESSURE: 93 MMHG | SYSTOLIC BLOOD PRESSURE: 131 MMHG

## 2021-07-07 DIAGNOSIS — Z79.82: ICD-10-CM

## 2021-07-07 DIAGNOSIS — D50.9: Primary | ICD-10-CM

## 2021-07-07 DIAGNOSIS — Z88.0: ICD-10-CM

## 2021-07-07 DIAGNOSIS — Z91.040: ICD-10-CM

## 2021-07-07 DIAGNOSIS — Z79.899: ICD-10-CM

## 2021-07-07 DIAGNOSIS — Z88.1: ICD-10-CM

## 2021-07-07 DIAGNOSIS — K64.0: ICD-10-CM

## 2021-07-07 DIAGNOSIS — Z88.2: ICD-10-CM

## 2021-07-07 DIAGNOSIS — K21.9: ICD-10-CM

## 2021-07-07 DIAGNOSIS — K22.8: ICD-10-CM

## 2021-07-07 NOTE — ROOR
________________________________________________________________________________

Patient Name: Rhina Babcock        Procedure Date: 7/7/2021 11:17 AM

MRN: [id number]                          Account Number: [id number]

YOB: 1975               Age: 46

Room: [facility]                            Gender: Female

Note Status: Finalized                 

________________________________________________________________________________

 

Procedure:            Colonoscopy

Indications:          Iron deficiency anemia

Providers:            Saran Lopez DO

Referring MD:         Bri CHILD MD

Requesting Provider:  

Medicines:            Propofol per Anesthesia

Complications:        No immediate complications.

________________________________________________________________________________

Procedure:            Pre-Anesthesia Assessment:

                      - Prior to the procedure, a History and Physical was 

                      performed, and patient medications and allergies were 

                      reviewed. The patient is competent. The risks and 

                      benefits of the procedure and the sedation options and 

                      risks were discussed with the patient. All questions 

                      were answered and informed consent was obtained. Patient 

                      identification and proposed procedure were verified by 

                      the physician, the nurse, the anesthetist and the 

                      technician in the endoscopy suite. Mental Status 

                      Examination: alert and oriented. Airway Examination: 

                      normal oropharyngeal airway and neck mobility. 

                      Respiratory Examination: clear to auscultation. CV 

                      Examination: normal. Prophylactic Antibiotics: The 

                      patient does not require prophylactic antibiotics. Prior 

                      Anticoagulants: The patient has taken no previous 

                      anticoagulant or antiplatelet agents. ASA Grade 

                      Assessment: II - A patient with mild systemic disease. 

                      After reviewing the risks and benefits, the patient was 

                      deemed in satisfactory condition to undergo the 

                      procedure. The anesthesia plan was to use monitored 

                      anesthesia care (MAC). Immediately prior to 

                      administration of medications, the patient was 

                      re-assessed for adequacy to receive sedatives. The heart 

                      rate, respiratory rate, oxygen saturations, blood 

                      pressure, adequacy of pulmonary ventilation, and 

                      response to care were monitored throughout the 

                      procedure. The physical status of the patient was 

                      re-assessed after the procedure.

                      The Colonoscope was introduced through the anus and 

                      advanced to the cecum, identified by appendiceal orifice 

                      and ileocecal valve. The colonoscopy was performed 

                      without difficulty. The patient tolerated the procedure 

                      well.

                                                                                

Findings:

     Non-bleeding internal hemorrhoids were found during retroflexion. The 

     hemorrhoids were Grade I (internal hemorrhoids that do not prolapse).

                                                                                

Impression:           - Non-bleeding internal hemorrhoids.

                      - No specimens collected.

Recommendation:       - Patient has a contact number available for 

                      emergencies. The signs and symptoms of potential delayed 

                      complications were discussed with the patient. Return to 

                      normal activities tomorrow. Written discharge 

                      instructions were provided to the patient.

                      - Repeat colonoscopy in 5-10 years for screening 

                      purposes.

                      - Return to my office PRN.

                                                                                

Procedure Code(s):    --- Professional ---

                      46561, Colonoscopy, flexible; diagnostic, including 

                      collection of specimen(s) by brushing or washing, when 

                      performed (separate procedure)

Diagnosis Code(s):    --- Professional ---

                      K64.0, First degree hemorrhoids

                      D50.9, Iron deficiency anemia, unspecified

 

CPT copyright 2019 [employer]. All rights reserved.

 

The codes documented in this report are preliminary and upon  review may 

be revised to meet current compliance requirements.

 

_________________

Saran Lopez DO

7/7/2021 11:52:45 AM

Electronically signed by Saran Lopez DO

Number of Addenda: 0

 

Note Initiated On: 7/7/2021 11:17 AM

Estimated Blood Loss: Estimated blood loss: none.

## 2021-07-07 NOTE — ROOR
________________________________________________________________________________

Patient Name: Rhina Babcock        Procedure Date: 7/7/2021 11:16 AM

MRN: [id number]                          Account Number: [id number]

YOB: 1975               Age: 46

Room: [facility]                            Gender: Female

Note Status: Finalized                 

________________________________________________________________________________

 

Procedure:            Upper GI endoscopy

Indications:          Iron deficiency anemia

Providers:            DO Karla Mejía MD:         Bri CHILD MD

Requesting Provider:  

Medicines:            Propofol per Anesthesia

Complications:        No immediate complications.

________________________________________________________________________________

Procedure:            Pre-Anesthesia Assessment:

                      - Prior to the procedure, a History and Physical was 

                      performed, and patient medications and allergies were 

                      reviewed. The patient is competent. The risks and 

                      benefits of the procedure and the sedation options and 

                      risks were discussed with the patient. All questions 

                      were answered and informed consent was obtained. Patient 

                      identification and proposed procedure were verified by 

                      the physician, the nurse, the anesthetist and the 

                      technician in the endoscopy suite. Mental Status 

                      Examination: alert and oriented. Airway Examination: 

                      normal oropharyngeal airway and neck mobility. 

                      Respiratory Examination: clear to auscultation. CV 

                      Examination: normal. Prophylactic Antibiotics: The 

                      patient does not require prophylactic antibiotics. Prior 

                      Anticoagulants: The patient has taken no previous 

                      anticoagulant or antiplatelet agents. ASA Grade 

                      Assessment: II - A patient with mild systemic disease. 

                      After reviewing the risks and benefits, the patient was 

                      deemed in satisfactory condition to undergo the 

                      procedure. The anesthesia plan was to use monitored 

                      anesthesia care (MAC). Immediately prior to 

                      administration of medications, the patient was 

                      re-assessed for adequacy to receive sedatives. The heart 

                      rate, respiratory rate, oxygen saturations, blood 

                      pressure, adequacy of pulmonary ventilation, and 

                      response to care were monitored throughout the 

                      procedure. The physical status of the patient was 

                      re-assessed after the procedure.

                      The Endoscope was introduced through the mouth, and 

                      advanced to the third part of duodenum. The upper GI 

                      endoscopy was accomplished without difficulty. The 

                      patient tolerated the procedure well.

                                                                                

Findings:

     The Z-line was irregular. Biopsies were taken with a cold forceps for 

     histology. Estimated blood loss was minimal.

     The exam was otherwise without abnormality.

                                                                                

Impression:           - Z-line irregular. Biopsied.

                      - The examination was otherwise normal.

Recommendation:       - Patient has a contact number available for 

                      emergencies. The signs and symptoms of potential delayed 

                      complications were discussed with the patient. Return to 

                      normal activities tomorrow. Written discharge 

                      instructions were provided to the patient.

                      - Await pathology results.

                      - Return to physician assistant at appointment to be 

                      scheduled.

                                                                                

Procedure Code(s):    --- Professional ---

                      60518, Esophagogastroduodenoscopy, flexible, transoral; 

                      with biopsy, single or multiple

Diagnosis Code(s):    --- Professional ---

                      K22.8, Other specified diseases of esophagus

                      D50.9, Iron deficiency anemia, unspecified

 

CPT copyright 2019 [employer]. All rights reserved.

 

The codes documented in this report are preliminary and upon  review may 

be revised to meet current compliance requirements.

 

_________________

Saran Lopez DO

7/7/2021 11:47:15 AM

Electronically signed by Saran Lopez DO

Number of Addenda: 0

 

Note Initiated On: 7/7/2021 11:16 AM

Estimated Blood Loss: Estimated blood loss was minimal.

## 2021-10-28 ENCOUNTER — HOSPITAL ENCOUNTER (OUTPATIENT)
Dept: HOSPITAL 53 - M LAB REF | Age: 46
End: 2021-10-28
Attending: INTERNAL MEDICINE
Payer: COMMERCIAL

## 2021-10-28 DIAGNOSIS — E03.9: Primary | ICD-10-CM

## 2021-10-30 LAB
DSDNA AB SER-ACNC: 2 IU/ML (ref 0–9)
ENA RNP AB SER-ACNC: <0.2 AI (ref 0–0.9)
ENA SM AB SER-ACNC: <0.2 AI (ref 0–0.9)
ENA SS-A AB SER-ACNC: <0.2 AI (ref 0–0.9)
ENA SS-B AB SER-ACNC: 4.3 AI (ref 0–0.9)

## 2021-11-15 ENCOUNTER — HOSPITAL ENCOUNTER (OUTPATIENT)
Dept: HOSPITAL 53 - M LAB | Age: 46
End: 2021-11-15
Attending: PSYCHIATRY & NEUROLOGY
Payer: COMMERCIAL

## 2021-11-15 DIAGNOSIS — E07.9: Primary | ICD-10-CM

## 2021-11-15 LAB
T4 FREE SERPL-MCNC: 0.88 NG/DL (ref 0.76–1.46)
TSH SERPL DL<=0.005 MIU/L-ACNC: 1.04 UIU/ML (ref 0.36–3.74)

## 2021-11-16 LAB
DSDNA AB SER-ACNC: 1 IU/ML (ref 0–9)
ENA RNP AB SER-ACNC: <0.2 AI (ref 0–0.9)
ENA SM AB SER-ACNC: <0.2 AI (ref 0–0.9)
ENA SS-A AB SER-ACNC: <0.2 AI (ref 0–0.9)
ENA SS-B AB SER-ACNC: 4.6 AI (ref 0–0.9)

## 2022-01-26 ENCOUNTER — HOSPITAL ENCOUNTER (OUTPATIENT)
Dept: HOSPITAL 53 - M LAB REF | Age: 47
End: 2022-01-26
Attending: INTERNAL MEDICINE
Payer: COMMERCIAL

## 2022-01-26 DIAGNOSIS — D50.9: ICD-10-CM

## 2022-01-26 DIAGNOSIS — R76.0: Primary | ICD-10-CM

## 2022-01-26 DIAGNOSIS — R76.9: ICD-10-CM

## 2022-01-26 LAB
CRP SERPL-MCNC: < 0.3 MG/DL (ref 0–0.3)
FERRITIN SERPL-MCNC: 5 NG/ML (ref 8–252)

## 2022-05-20 ENCOUNTER — HOSPITAL ENCOUNTER (OUTPATIENT)
Dept: HOSPITAL 53 - M SFHCRHEU | Age: 47
End: 2022-05-20
Attending: INTERNAL MEDICINE
Payer: COMMERCIAL

## 2022-05-20 DIAGNOSIS — R76.8: Primary | ICD-10-CM

## 2022-05-20 DIAGNOSIS — M79.10: ICD-10-CM

## 2022-05-20 LAB
25(OH)D3 SERPL-MCNC: 82 NG/ML (ref 30–100)
APPEARANCE UR: CLEAR
BACTERIA UR QL AUTO: NEGATIVE
BILIRUB UR QL STRIP.AUTO: NEGATIVE
C3 SERPL-MCNC: 90 MG/DL (ref 90–180)
C4 SERPL-MCNC: 32 MG/DL (ref 10–40)
CREAT UR-MCNC: 15.3 MG/DL
GLUCOSE UR QL STRIP.AUTO: NEGATIVE MG/DL
HGB UR QL STRIP.AUTO: NEGATIVE
KETONES UR QL STRIP.AUTO: NEGATIVE MG/DL
LEUKOCYTE ESTERASE UR QL STRIP.AUTO: NEGATIVE
MAGNESIUM SERPL-MCNC: 2.2 MG/DL (ref 1.8–2.4)
MUCOUS THREADS URNS QL MICRO: (no result)
NITRITE UR QL STRIP.AUTO: NEGATIVE
PH UR STRIP.AUTO: 6 UNITS (ref 5–9)
PHOSPHATE SERPL-MCNC: 3.6 MG/DL (ref 2.5–4.9)
PROT UR QL STRIP.AUTO: NEGATIVE MG/DL
PROT UR-MCNC: < 5 MG/DL (ref 0–12)
RBC # UR AUTO: 0 /HPF (ref 0–3)
SP GR UR STRIP.AUTO: 1 (ref 1–1.03)
SQUAMOUS #/AREA URNS AUTO: 0 /HPF (ref 0–6)
UROBILINOGEN UR QL STRIP.AUTO: 0.2 MG/DL (ref 0–2)
VIT B12 SERPL-MCNC: > 2000 PG/ML (ref 247–911)
WBC #/AREA URNS AUTO: 0 /HPF (ref 0–3)

## 2023-01-17 ENCOUNTER — HOSPITAL ENCOUNTER (OUTPATIENT)
Dept: HOSPITAL 53 - M LAB REF | Age: 48
End: 2023-01-17
Attending: INTERNAL MEDICINE
Payer: COMMERCIAL

## 2023-01-17 DIAGNOSIS — D64.9: Primary | ICD-10-CM

## 2023-01-18 LAB
FERRITIN SERPL-MCNC: 3 NG/ML (ref 7.3–270.7)
IRON SATN MFR SERPL: 7.1 % (ref 13.2–45)
IRON SERPL-MCNC: 28 UG/DL (ref 50–170)
TIBC SERPL-MCNC: 397 UG/DL (ref 250–425)

## 2023-02-28 ENCOUNTER — HOSPITAL ENCOUNTER (OUTPATIENT)
Dept: HOSPITAL 53 - M WUC | Age: 48
End: 2023-02-28
Attending: INTERNAL MEDICINE
Payer: COMMERCIAL

## 2023-02-28 DIAGNOSIS — R06.00: Primary | ICD-10-CM

## 2023-02-28 DIAGNOSIS — R53.1: ICD-10-CM

## 2023-03-14 ENCOUNTER — HOSPITAL ENCOUNTER (OUTPATIENT)
Dept: HOSPITAL 53 - M LAB REF | Age: 48
End: 2023-03-14
Attending: INTERNAL MEDICINE
Payer: COMMERCIAL

## 2023-03-14 DIAGNOSIS — R30.0: Primary | ICD-10-CM

## 2023-03-14 LAB
APPEARANCE UR: CLEAR
BACTERIA UR QL AUTO: NEGATIVE
BILIRUB UR QL STRIP.AUTO: NEGATIVE
GLUCOSE UR QL STRIP.AUTO: NEGATIVE MG/DL
HGB UR QL STRIP.AUTO: (no result)
KETONES UR QL STRIP.AUTO: NEGATIVE MG/DL
LEUKOCYTE ESTERASE UR QL STRIP.AUTO: NEGATIVE
NITRITE UR QL STRIP.AUTO: NEGATIVE
PH UR STRIP.AUTO: 7 UNITS (ref 5–9)
PROT UR QL STRIP.AUTO: NEGATIVE MG/DL
RBC # UR AUTO: 2 /HPF (ref 0–3)
SP GR UR STRIP.AUTO: 1.01 (ref 1–1.03)
SQUAMOUS #/AREA URNS AUTO: 1 /HPF (ref 0–6)
UROBILINOGEN UR QL STRIP.AUTO: 0.2 MG/DL (ref 0–2)
WBC #/AREA URNS AUTO: 1 /HPF (ref 0–3)

## 2023-04-24 ENCOUNTER — HOSPITAL ENCOUNTER (OUTPATIENT)
Dept: HOSPITAL 53 - M LAB REF | Age: 48
End: 2023-04-24
Attending: INTERNAL MEDICINE
Payer: COMMERCIAL

## 2023-04-24 DIAGNOSIS — D50.9: Primary | ICD-10-CM

## 2023-04-24 DIAGNOSIS — R53.83: ICD-10-CM

## 2023-04-24 LAB
FERRITIN SERPL-MCNC: 4.8 NG/ML (ref 7.3–270.7)
IRON SATN MFR SERPL: 11.8 % (ref 13.2–45)
IRON SERPL-MCNC: 47 UG/DL (ref 50–170)
TIBC SERPL-MCNC: 398 UG/DL (ref 250–425)

## 2023-05-08 ENCOUNTER — HOSPITAL ENCOUNTER (OUTPATIENT)
Dept: HOSPITAL 53 - M INFU | Age: 48
End: 2023-05-08
Attending: INTERNAL MEDICINE
Payer: COMMERCIAL

## 2023-05-08 VITALS — DIASTOLIC BLOOD PRESSURE: 88 MMHG | SYSTOLIC BLOOD PRESSURE: 157 MMHG

## 2023-05-08 VITALS — SYSTOLIC BLOOD PRESSURE: 130 MMHG | DIASTOLIC BLOOD PRESSURE: 84 MMHG

## 2023-05-08 VITALS — HEIGHT: 61 IN | BODY MASS INDEX: 30.27 KG/M2 | WEIGHT: 160.32 LBS

## 2023-05-08 DIAGNOSIS — D50.9: Primary | ICD-10-CM

## 2023-05-08 DIAGNOSIS — Z88.0: ICD-10-CM

## 2023-05-08 DIAGNOSIS — Z88.2: ICD-10-CM

## 2023-05-08 PROCEDURE — 96365 THER/PROPH/DIAG IV INF INIT: CPT

## 2023-05-08 PROCEDURE — 96366 THER/PROPH/DIAG IV INF ADDON: CPT

## 2023-05-17 ENCOUNTER — HOSPITAL ENCOUNTER (OUTPATIENT)
Dept: HOSPITAL 53 - M INFU | Age: 48
Discharge: HOME | End: 2023-05-17
Attending: INTERNAL MEDICINE
Payer: COMMERCIAL

## 2023-05-17 VITALS — SYSTOLIC BLOOD PRESSURE: 141 MMHG | DIASTOLIC BLOOD PRESSURE: 73 MMHG

## 2023-05-17 VITALS — HEIGHT: 60 IN | BODY MASS INDEX: 31.42 KG/M2 | WEIGHT: 160.06 LBS

## 2023-05-17 VITALS — SYSTOLIC BLOOD PRESSURE: 128 MMHG | DIASTOLIC BLOOD PRESSURE: 81 MMHG

## 2023-05-17 DIAGNOSIS — Z88.2: ICD-10-CM

## 2023-05-17 DIAGNOSIS — D50.9: Primary | ICD-10-CM

## 2023-05-17 DIAGNOSIS — Z88.0: ICD-10-CM

## 2023-05-17 DIAGNOSIS — Z91.040: ICD-10-CM

## 2023-05-17 PROCEDURE — 96365 THER/PROPH/DIAG IV INF INIT: CPT

## 2023-06-05 ENCOUNTER — HOSPITAL ENCOUNTER (OUTPATIENT)
Dept: HOSPITAL 53 - M LAB REF | Age: 48
End: 2023-06-05
Attending: INTERNAL MEDICINE
Payer: COMMERCIAL

## 2023-06-05 DIAGNOSIS — D50.9: Primary | ICD-10-CM

## 2023-06-05 LAB
FERRITIN SERPL-MCNC: 185.2 NG/ML (ref 7.3–270.7)
IRON SATN MFR SERPL: 33.6 % (ref 13.2–45)
IRON SERPL-MCNC: 98 UG/DL (ref 50–170)
TIBC SERPL-MCNC: 292 UG/DL (ref 250–425)

## 2023-07-31 ENCOUNTER — HOSPITAL ENCOUNTER (OUTPATIENT)
Dept: HOSPITAL 53 - M LAB REF | Age: 48
End: 2023-07-31
Attending: INTERNAL MEDICINE
Payer: COMMERCIAL

## 2023-07-31 DIAGNOSIS — D50.9: Primary | ICD-10-CM

## 2023-08-16 ENCOUNTER — HOSPITAL ENCOUNTER (OUTPATIENT)
Dept: HOSPITAL 53 - M LAB REF | Age: 48
End: 2023-08-16
Attending: INTERNAL MEDICINE
Payer: COMMERCIAL

## 2023-08-16 DIAGNOSIS — D50.9: Primary | ICD-10-CM

## 2023-09-06 ENCOUNTER — HOSPITAL ENCOUNTER (OUTPATIENT)
Dept: HOSPITAL 53 - M INFU | Age: 48
End: 2023-09-06
Attending: INTERNAL MEDICINE
Payer: COMMERCIAL

## 2023-09-06 VITALS — OXYGEN SATURATION: 97 % | DIASTOLIC BLOOD PRESSURE: 80 MMHG | SYSTOLIC BLOOD PRESSURE: 129 MMHG

## 2023-09-06 VITALS
WEIGHT: 157.41 LBS | BODY MASS INDEX: 29.72 KG/M2 | HEIGHT: 61 IN | SYSTOLIC BLOOD PRESSURE: 156 MMHG | OXYGEN SATURATION: 99 % | DIASTOLIC BLOOD PRESSURE: 76 MMHG

## 2023-09-06 DIAGNOSIS — Z88.2: ICD-10-CM

## 2023-09-06 DIAGNOSIS — D50.9: Primary | ICD-10-CM

## 2023-09-06 DIAGNOSIS — Z88.0: ICD-10-CM

## 2023-09-06 PROCEDURE — 96366 THER/PROPH/DIAG IV INF ADDON: CPT

## 2023-09-06 PROCEDURE — 96365 THER/PROPH/DIAG IV INF INIT: CPT

## 2023-09-08 ENCOUNTER — HOSPITAL ENCOUNTER (OUTPATIENT)
Dept: HOSPITAL 53 - M LAB REF | Age: 48
End: 2023-09-08
Attending: INTERNAL MEDICINE
Payer: COMMERCIAL

## 2023-09-08 DIAGNOSIS — D50.9: Primary | ICD-10-CM

## 2023-11-01 ENCOUNTER — HOSPITAL ENCOUNTER (OUTPATIENT)
Dept: HOSPITAL 53 - M LAB REF | Age: 48
End: 2023-11-01
Attending: INTERNAL MEDICINE
Payer: COMMERCIAL

## 2023-11-01 DIAGNOSIS — D50.9: Primary | ICD-10-CM

## 2023-11-16 ENCOUNTER — HOSPITAL ENCOUNTER (OUTPATIENT)
Dept: HOSPITAL 53 - M PLARAD | Age: 48
End: 2023-11-16
Attending: OTOLARYNGOLOGY
Payer: COMMERCIAL

## 2023-11-16 DIAGNOSIS — D33.3: Primary | ICD-10-CM

## 2024-02-01 ENCOUNTER — HOSPITAL ENCOUNTER (OUTPATIENT)
Dept: HOSPITAL 53 - M LAB REF | Age: 49
End: 2024-02-01
Attending: INTERNAL MEDICINE
Payer: COMMERCIAL

## 2024-02-01 DIAGNOSIS — D50.9: Primary | ICD-10-CM

## 2024-02-01 LAB
FERRITIN SERPL-MCNC: 11.5 NG/ML (ref 7.3–270.7)
IRON SATN MFR SERPL: 12.5 % (ref 13.2–45)
IRON SERPL-MCNC: 40 UG/DL (ref 50–170)
TIBC SERPL-MCNC: 320 UG/DL (ref 250–425)

## 2024-02-06 ENCOUNTER — HOSPITAL ENCOUNTER (OUTPATIENT)
Dept: HOSPITAL 53 - M INFU | Age: 49
Discharge: HOME | End: 2024-02-06
Attending: INTERNAL MEDICINE
Payer: COMMERCIAL

## 2024-02-06 VITALS — DIASTOLIC BLOOD PRESSURE: 62 MMHG | OXYGEN SATURATION: 100 % | SYSTOLIC BLOOD PRESSURE: 133 MMHG

## 2024-02-06 VITALS — OXYGEN SATURATION: 100 % | SYSTOLIC BLOOD PRESSURE: 130 MMHG | DIASTOLIC BLOOD PRESSURE: 81 MMHG

## 2024-02-06 DIAGNOSIS — Z88.2: ICD-10-CM

## 2024-02-06 DIAGNOSIS — D50.9: Primary | ICD-10-CM

## 2024-02-06 DIAGNOSIS — Z88.0: ICD-10-CM

## 2024-02-06 PROCEDURE — 96365 THER/PROPH/DIAG IV INF INIT: CPT

## 2024-02-06 RX ADMIN — SODIUM CHLORIDE ONE MLS/HR: 9 INJECTION, SOLUTION INTRAVENOUS at 12:11

## 2024-02-06 RX ADMIN — DIPHENHYDRAMINE HYDROCHLORIDE ONE MG: 25 CAPSULE ORAL at 12:13

## 2024-02-06 RX ADMIN — ACETAMINOPHEN ONE MG: 325 TABLET ORAL at 12:13

## 2024-02-07 ENCOUNTER — HOSPITAL ENCOUNTER (OUTPATIENT)
Dept: HOSPITAL 53 - M SFHCWAGY | Age: 49
End: 2024-02-07
Attending: OBSTETRICS & GYNECOLOGY
Payer: COMMERCIAL

## 2024-02-07 DIAGNOSIS — N93.9: Primary | ICD-10-CM

## 2024-02-13 ENCOUNTER — HOSPITAL ENCOUNTER (OUTPATIENT)
Dept: HOSPITAL 53 - M INFU | Age: 49
End: 2024-02-13
Attending: INTERNAL MEDICINE
Payer: COMMERCIAL

## 2024-02-13 VITALS — SYSTOLIC BLOOD PRESSURE: 139 MMHG | DIASTOLIC BLOOD PRESSURE: 80 MMHG | OXYGEN SATURATION: 98 %

## 2024-02-13 VITALS — BODY MASS INDEX: 31.21 KG/M2 | WEIGHT: 158.95 LBS | HEIGHT: 60 IN

## 2024-02-13 VITALS — OXYGEN SATURATION: 98 % | SYSTOLIC BLOOD PRESSURE: 130 MMHG | DIASTOLIC BLOOD PRESSURE: 77 MMHG

## 2024-02-13 DIAGNOSIS — Z88.0: ICD-10-CM

## 2024-02-13 DIAGNOSIS — Z88.2: ICD-10-CM

## 2024-02-13 DIAGNOSIS — D50.9: Primary | ICD-10-CM

## 2024-02-13 PROCEDURE — 96365 THER/PROPH/DIAG IV INF INIT: CPT

## 2024-02-13 RX ADMIN — SODIUM CHLORIDE ONE MLS/HR: 9 INJECTION, SOLUTION INTRAVENOUS at 13:36

## 2024-02-13 RX ADMIN — DIPHENHYDRAMINE HYDROCHLORIDE ONE MG: 25 CAPSULE ORAL at 13:42

## 2024-02-13 RX ADMIN — ACETAMINOPHEN ONE MG: 325 TABLET ORAL at 13:42

## 2024-03-18 ENCOUNTER — HOSPITAL ENCOUNTER (OUTPATIENT)
Dept: HOSPITAL 53 - M LAB REF | Age: 49
End: 2024-03-18
Attending: INTERNAL MEDICINE
Payer: COMMERCIAL

## 2024-03-18 DIAGNOSIS — D50.9: Primary | ICD-10-CM

## 2024-03-25 ENCOUNTER — HOSPITAL ENCOUNTER (OUTPATIENT)
Dept: HOSPITAL 53 - M SDC | Age: 49
Setting detail: OBSERVATION
Discharge: HOME | End: 2024-03-25
Attending: OBSTETRICS & GYNECOLOGY | Admitting: OBSTETRICS & GYNECOLOGY
Payer: COMMERCIAL

## 2024-03-25 VITALS — DIASTOLIC BLOOD PRESSURE: 63 MMHG | SYSTOLIC BLOOD PRESSURE: 105 MMHG | TEMPERATURE: 97.5 F | OXYGEN SATURATION: 99 %

## 2024-03-25 VITALS — SYSTOLIC BLOOD PRESSURE: 112 MMHG | DIASTOLIC BLOOD PRESSURE: 67 MMHG | OXYGEN SATURATION: 96 % | TEMPERATURE: 97.7 F

## 2024-03-25 VITALS — TEMPERATURE: 97.7 F | OXYGEN SATURATION: 100 % | DIASTOLIC BLOOD PRESSURE: 71 MMHG | SYSTOLIC BLOOD PRESSURE: 113 MMHG

## 2024-03-25 VITALS — TEMPERATURE: 97.3 F | DIASTOLIC BLOOD PRESSURE: 86 MMHG | OXYGEN SATURATION: 98 % | SYSTOLIC BLOOD PRESSURE: 106 MMHG

## 2024-03-25 VITALS — WEIGHT: 158 LBS | BODY MASS INDEX: 29.83 KG/M2 | HEIGHT: 61 IN

## 2024-03-25 DIAGNOSIS — Z88.2: ICD-10-CM

## 2024-03-25 DIAGNOSIS — Z79.899: ICD-10-CM

## 2024-03-25 DIAGNOSIS — J45.909: ICD-10-CM

## 2024-03-25 DIAGNOSIS — D64.9: ICD-10-CM

## 2024-03-25 DIAGNOSIS — K21.9: ICD-10-CM

## 2024-03-25 DIAGNOSIS — N73.6: ICD-10-CM

## 2024-03-25 DIAGNOSIS — Z91.040: ICD-10-CM

## 2024-03-25 DIAGNOSIS — Z92.3: ICD-10-CM

## 2024-03-25 DIAGNOSIS — N80.03: ICD-10-CM

## 2024-03-25 DIAGNOSIS — R10.2: ICD-10-CM

## 2024-03-25 DIAGNOSIS — R01.1: ICD-10-CM

## 2024-03-25 DIAGNOSIS — Z88.0: ICD-10-CM

## 2024-03-25 DIAGNOSIS — Z79.51: ICD-10-CM

## 2024-03-25 DIAGNOSIS — I10: ICD-10-CM

## 2024-03-25 DIAGNOSIS — D25.9: Primary | ICD-10-CM

## 2024-03-25 LAB
B-HCG SERPL QL: NEGATIVE
HCT VFR BLD AUTO: 36.8 % (ref 36–47)
HGB BLD-MCNC: 12.2 G/DL (ref 12–15.5)
MCH RBC QN AUTO: 32.7 PG (ref 27–33)
MCHC RBC AUTO-ENTMCNC: 33.2 G/DL (ref 32–36.5)
MCV RBC AUTO: 98.7 FL (ref 80–96)
PLATELET # BLD AUTO: 295 10^3/UL (ref 150–450)
RBC # BLD AUTO: 3.73 10^6/UL (ref 4–5.4)
WBC # BLD AUTO: 4 10^3/UL (ref 4–10)

## 2024-03-25 PROCEDURE — 58571 TLH W/T/O 250 G OR LESS: CPT

## 2024-03-25 PROCEDURE — 85027 COMPLETE CBC AUTOMATED: CPT

## 2024-03-25 PROCEDURE — 84703 CHORIONIC GONADOTROPIN ASSAY: CPT

## 2024-03-25 PROCEDURE — 86850 RBC ANTIBODY SCREEN: CPT

## 2024-03-25 PROCEDURE — 88307 TISSUE EXAM BY PATHOLOGIST: CPT

## 2024-03-25 PROCEDURE — 86900 BLOOD TYPING SEROLOGIC ABO: CPT

## 2024-03-25 PROCEDURE — 86901 BLOOD TYPING SEROLOGIC RH(D): CPT

## 2024-03-25 RX ADMIN — CEFAZOLIN SODIUM ONE MLS/HR: 2 SOLUTION INTRAVENOUS at 12:45

## 2024-03-25 RX ADMIN — MEPERIDINE HYDROCHLORIDE PRN MG: 25 INJECTION INTRAMUSCULAR; INTRAVENOUS; SUBCUTANEOUS at 15:22

## 2024-03-25 RX ADMIN — METOCLOPRAMIDE PRN MG: 5 INJECTION, SOLUTION INTRAMUSCULAR; INTRAVENOUS at 15:29

## 2024-03-25 RX ADMIN — ONDANSETRON PRN MG: 2 INJECTION INTRAMUSCULAR; INTRAVENOUS at 15:05

## 2024-03-25 RX ADMIN — SODIUM CHLORIDE, POTASSIUM CHLORIDE, SODIUM LACTATE AND CALCIUM CHLORIDE SCH MLS/HR: 600; 310; 30; 20 INJECTION, SOLUTION INTRAVENOUS at 16:07

## 2024-03-25 RX ADMIN — METHYLENE BLUE ONE ML: 5 INJECTION INTRAVENOUS at 13:34

## 2024-04-11 ENCOUNTER — HOSPITAL ENCOUNTER (OUTPATIENT)
Dept: HOSPITAL 53 - M ED | Age: 49
Setting detail: OBSERVATION
LOS: 1 days | Discharge: HOME | End: 2024-04-12
Attending: OBSTETRICS & GYNECOLOGY | Admitting: OBSTETRICS & GYNECOLOGY
Payer: COMMERCIAL

## 2024-04-11 VITALS — TEMPERATURE: 98.7 F | OXYGEN SATURATION: 97 % | DIASTOLIC BLOOD PRESSURE: 70 MMHG | SYSTOLIC BLOOD PRESSURE: 102 MMHG

## 2024-04-11 VITALS — SYSTOLIC BLOOD PRESSURE: 119 MMHG | TEMPERATURE: 98.6 F | DIASTOLIC BLOOD PRESSURE: 66 MMHG

## 2024-04-11 VITALS — WEIGHT: 165.57 LBS | HEIGHT: 61 IN | BODY MASS INDEX: 31.26 KG/M2

## 2024-04-11 VITALS — SYSTOLIC BLOOD PRESSURE: 108 MMHG | OXYGEN SATURATION: 98 % | TEMPERATURE: 99.3 F | DIASTOLIC BLOOD PRESSURE: 64 MMHG

## 2024-04-11 VITALS — DIASTOLIC BLOOD PRESSURE: 64 MMHG | SYSTOLIC BLOOD PRESSURE: 103 MMHG

## 2024-04-11 VITALS — DIASTOLIC BLOOD PRESSURE: 63 MMHG | TEMPERATURE: 98.8 F | OXYGEN SATURATION: 97 % | SYSTOLIC BLOOD PRESSURE: 95 MMHG

## 2024-04-11 VITALS — SYSTOLIC BLOOD PRESSURE: 98 MMHG | OXYGEN SATURATION: 18 % | DIASTOLIC BLOOD PRESSURE: 59 MMHG | TEMPERATURE: 97.7 F

## 2024-04-11 VITALS — SYSTOLIC BLOOD PRESSURE: 93 MMHG | OXYGEN SATURATION: 98 % | DIASTOLIC BLOOD PRESSURE: 55 MMHG | TEMPERATURE: 98.6 F

## 2024-04-11 VITALS — TEMPERATURE: 98.4 F | OXYGEN SATURATION: 99 % | DIASTOLIC BLOOD PRESSURE: 61 MMHG | SYSTOLIC BLOOD PRESSURE: 99 MMHG

## 2024-04-11 VITALS — OXYGEN SATURATION: 99 % | SYSTOLIC BLOOD PRESSURE: 124 MMHG | DIASTOLIC BLOOD PRESSURE: 80 MMHG | TEMPERATURE: 98.3 F

## 2024-04-11 VITALS — DIASTOLIC BLOOD PRESSURE: 56 MMHG | OXYGEN SATURATION: 98 % | SYSTOLIC BLOOD PRESSURE: 96 MMHG | TEMPERATURE: 97.7 F

## 2024-04-11 VITALS — DIASTOLIC BLOOD PRESSURE: 62 MMHG | SYSTOLIC BLOOD PRESSURE: 103 MMHG | TEMPERATURE: 98.3 F | OXYGEN SATURATION: 97 %

## 2024-04-11 VITALS — OXYGEN SATURATION: 97 % | TEMPERATURE: 98.6 F | DIASTOLIC BLOOD PRESSURE: 72 MMHG | SYSTOLIC BLOOD PRESSURE: 100 MMHG

## 2024-04-11 DIAGNOSIS — Z79.899: ICD-10-CM

## 2024-04-11 DIAGNOSIS — D33.3: ICD-10-CM

## 2024-04-11 DIAGNOSIS — D64.9: ICD-10-CM

## 2024-04-11 DIAGNOSIS — Z91.040: ICD-10-CM

## 2024-04-11 DIAGNOSIS — Z88.2: ICD-10-CM

## 2024-04-11 DIAGNOSIS — N99.820: Primary | ICD-10-CM

## 2024-04-11 DIAGNOSIS — I95.9: ICD-10-CM

## 2024-04-11 DIAGNOSIS — D25.9: ICD-10-CM

## 2024-04-11 DIAGNOSIS — Z88.0: ICD-10-CM

## 2024-04-11 LAB
HCT VFR BLD AUTO: 27.1 % (ref 36–47)
HCT VFR BLD AUTO: 33.1 % (ref 36–47)
HCT VFR BLD AUTO: 33.1 % (ref 36–47)
HGB BLD-MCNC: 11.2 G/DL (ref 12–15.5)
HGB BLD-MCNC: 11.3 G/DL (ref 12–15.5)
HGB BLD-MCNC: 9.2 G/DL (ref 12–15.5)
MCH RBC QN AUTO: 31.4 PG (ref 27–33)
MCH RBC QN AUTO: 32 PG (ref 27–33)
MCHC RBC AUTO-ENTMCNC: 33.8 G/DL (ref 32–36.5)
MCHC RBC AUTO-ENTMCNC: 34.1 G/DL (ref 32–36.5)
MCV RBC AUTO: 91.9 FL (ref 80–96)
MCV RBC AUTO: 94.6 FL (ref 80–96)
PLATELET # BLD AUTO: 226 10^3/UL (ref 150–450)
PLATELET # BLD AUTO: 312 10^3/UL (ref 150–450)
RBC # BLD AUTO: 3.5 10^6/UL (ref 4–5.4)
RBC # BLD AUTO: 3.6 10^6/UL (ref 4–5.4)
WBC # BLD AUTO: 5.4 10^3/UL (ref 4–10)
WBC # BLD AUTO: 7.1 10^3/UL (ref 4–10)

## 2024-04-11 PROCEDURE — 80047 BASIC METABLC PNL IONIZED CA: CPT

## 2024-04-11 PROCEDURE — 74177 CT ABD & PELVIS W/CONTRAST: CPT

## 2024-04-11 PROCEDURE — 85018 HEMOGLOBIN: CPT

## 2024-04-11 PROCEDURE — 96376 TX/PRO/DX INJ SAME DRUG ADON: CPT

## 2024-04-11 PROCEDURE — 86920 COMPATIBILITY TEST SPIN: CPT

## 2024-04-11 PROCEDURE — 85027 COMPLETE CBC AUTOMATED: CPT

## 2024-04-11 PROCEDURE — 99285 EMERGENCY DEPT VISIT HI MDM: CPT

## 2024-04-11 PROCEDURE — 86850 RBC ANTIBODY SCREEN: CPT

## 2024-04-11 PROCEDURE — 93005 ELECTROCARDIOGRAM TRACING: CPT

## 2024-04-11 PROCEDURE — 86901 BLOOD TYPING SEROLOGIC RH(D): CPT

## 2024-04-11 PROCEDURE — 96366 THER/PROPH/DIAG IV INF ADDON: CPT

## 2024-04-11 PROCEDURE — 36415 COLL VENOUS BLD VENIPUNCTURE: CPT

## 2024-04-11 PROCEDURE — 85014 HEMATOCRIT: CPT

## 2024-04-11 PROCEDURE — 96375 TX/PRO/DX INJ NEW DRUG ADDON: CPT

## 2024-04-11 PROCEDURE — 94760 N-INVAS EAR/PLS OXIMETRY 1: CPT

## 2024-04-11 PROCEDURE — 96365 THER/PROPH/DIAG IV INF INIT: CPT

## 2024-04-11 PROCEDURE — 93041 RHYTHM ECG TRACING: CPT

## 2024-04-11 PROCEDURE — 51702 INSERT TEMP BLADDER CATH: CPT

## 2024-04-11 PROCEDURE — 71045 X-RAY EXAM CHEST 1 VIEW: CPT

## 2024-04-11 PROCEDURE — 36430 TRANSFUSION BLD/BLD COMPNT: CPT

## 2024-04-11 PROCEDURE — 96361 HYDRATE IV INFUSION ADD-ON: CPT

## 2024-04-11 PROCEDURE — 86900 BLOOD TYPING SEROLOGIC ABO: CPT

## 2024-04-11 RX ADMIN — POTASSIUM CHLORIDE SCH MLS/HR: 7.46 INJECTION, SOLUTION INTRAVENOUS at 14:00

## 2024-04-11 RX ADMIN — CEFAZOLIN SODIUM SCH MLS/HR: 2 SOLUTION INTRAVENOUS at 21:52

## 2024-04-11 RX ADMIN — SODIUM CHLORIDE ONE MLS/HR: 9 INJECTION, SOLUTION INTRAVENOUS at 02:07

## 2024-04-11 RX ADMIN — SODIUM CHLORIDE, PRESERVATIVE FREE SCH ML: 5 INJECTION INTRAVENOUS at 21:52

## 2024-04-11 RX ADMIN — SODIUM CHLORIDE ONE MLS/HR: 9 INJECTION, SOLUTION INTRAVENOUS at 03:54

## 2024-04-11 RX ADMIN — POTASSIUM CHLORIDE, DEXTROSE MONOHYDRATE AND SODIUM CHLORIDE SCH MLS/HR: 150; 5; 450 INJECTION, SOLUTION INTRAVENOUS at 14:00

## 2024-04-12 VITALS — TEMPERATURE: 97.9 F | SYSTOLIC BLOOD PRESSURE: 131 MMHG | DIASTOLIC BLOOD PRESSURE: 73 MMHG | OXYGEN SATURATION: 97 %

## 2024-04-12 LAB
HCT VFR BLD AUTO: 32.7 % (ref 36–47)
HGB BLD-MCNC: 11.1 G/DL (ref 12–15.5)
MCH RBC QN AUTO: 32 PG (ref 27–33)
MCHC RBC AUTO-ENTMCNC: 33.9 G/DL (ref 32–36.5)
MCV RBC AUTO: 94.2 FL (ref 80–96)
PLATELET # BLD AUTO: 208 10^3/UL (ref 150–450)
RBC # BLD AUTO: 3.47 10^6/UL (ref 4–5.4)
WBC # BLD AUTO: 5.2 10^3/UL (ref 4–10)

## 2024-04-16 ENCOUNTER — HOSPITAL ENCOUNTER (OUTPATIENT)
Dept: HOSPITAL 53 - M PLALAB | Age: 49
End: 2024-04-16
Attending: OBSTETRICS & GYNECOLOGY
Payer: COMMERCIAL

## 2024-04-16 DIAGNOSIS — Z48.816: Primary | ICD-10-CM

## 2024-04-16 LAB
ALBUMIN SERPL BCG-MCNC: 3.5 G/DL (ref 3.2–5.2)
ALP SERPL-CCNC: 48 U/L (ref 46–116)
ALT SERPL W P-5'-P-CCNC: 12 U/L (ref 7–40)
AST SERPL-CCNC: 8 U/L (ref ?–34)
BILIRUB SERPL-MCNC: 0.2 MG/DL (ref 0.3–1.2)
BUN SERPL-MCNC: 15 MG/DL (ref 9–23)
CALCIUM SERPL-MCNC: 9.2 MG/DL (ref 8.5–10.1)
CHLORIDE SERPL-SCNC: 105 MMOL/L (ref 98–107)
CO2 SERPL-SCNC: 30 MMOL/L (ref 20–31)
CREAT SERPL-MCNC: 0.81 MG/DL (ref 0.55–1.3)
GFR SERPL CREATININE-BSD FRML MDRD: > 60 ML/MIN/{1.73_M2} (ref 51–?)
GLUCOSE SERPL-MCNC: 99 MG/DL (ref 60–100)
HCT VFR BLD AUTO: 36.8 % (ref 36–47)
HGB BLD-MCNC: 11.9 G/DL (ref 12–15.5)
MCH RBC QN AUTO: 31.5 PG (ref 27–33)
MCHC RBC AUTO-ENTMCNC: 32.3 G/DL (ref 32–36.5)
MCV RBC AUTO: 97.4 FL (ref 80–96)
PLATELET # BLD AUTO: 306 10^3/UL (ref 150–450)
POTASSIUM SERPL-SCNC: 4.2 MMOL/L (ref 3.5–5.1)
PROT SERPL-MCNC: 6.5 G/DL (ref 5.7–8.2)
RBC # BLD AUTO: 3.78 10^6/UL (ref 4–5.4)
SODIUM SERPL-SCNC: 140 MMOL/L (ref 136–145)
WBC # BLD AUTO: 5 10^3/UL (ref 4–10)

## 2025-01-17 ENCOUNTER — HOSPITAL ENCOUNTER (OUTPATIENT)
Dept: HOSPITAL 53 - M SFHCRHEU | Age: 50
End: 2025-01-17
Attending: INTERNAL MEDICINE
Payer: COMMERCIAL

## 2025-01-17 DIAGNOSIS — M79.10: ICD-10-CM

## 2025-01-17 DIAGNOSIS — R76.8: Primary | ICD-10-CM

## 2025-01-20 LAB
ANA SER QL IF: POSITIVE
ANA TITR SER IF: (no result) TITER